# Patient Record
Sex: FEMALE | Race: WHITE | NOT HISPANIC OR LATINO
[De-identification: names, ages, dates, MRNs, and addresses within clinical notes are randomized per-mention and may not be internally consistent; named-entity substitution may affect disease eponyms.]

---

## 2017-03-31 ENCOUNTER — APPOINTMENT (OUTPATIENT)
Dept: DERMATOLOGY | Facility: CLINIC | Age: 62
End: 2017-03-31

## 2017-04-12 ENCOUNTER — APPOINTMENT (OUTPATIENT)
Dept: SURGERY | Facility: CLINIC | Age: 62
End: 2017-04-12

## 2017-04-12 VITALS
DIASTOLIC BLOOD PRESSURE: 74 MMHG | SYSTOLIC BLOOD PRESSURE: 120 MMHG | TEMPERATURE: 98.4 F | HEART RATE: 66 BPM | RESPIRATION RATE: 16 BRPM | OXYGEN SATURATION: 97 %

## 2017-04-12 DIAGNOSIS — E78.00 PURE HYPERCHOLESTEROLEMIA, UNSPECIFIED: ICD-10-CM

## 2017-04-13 ENCOUNTER — OTHER (OUTPATIENT)
Age: 62
End: 2017-04-13

## 2017-04-27 ENCOUNTER — APPOINTMENT (OUTPATIENT)
Dept: OBGYN | Facility: CLINIC | Age: 62
End: 2017-04-27

## 2017-04-27 VITALS — SYSTOLIC BLOOD PRESSURE: 110 MMHG | HEIGHT: 60 IN | DIASTOLIC BLOOD PRESSURE: 70 MMHG

## 2017-05-08 ENCOUNTER — APPOINTMENT (OUTPATIENT)
Dept: FAMILY MEDICINE | Facility: CLINIC | Age: 62
End: 2017-05-08

## 2017-05-08 VITALS
RESPIRATION RATE: 12 BRPM | TEMPERATURE: 98.7 F | SYSTOLIC BLOOD PRESSURE: 118 MMHG | HEART RATE: 67 BPM | BODY MASS INDEX: 31.41 KG/M2 | OXYGEN SATURATION: 98 % | DIASTOLIC BLOOD PRESSURE: 58 MMHG | WEIGHT: 160 LBS | HEIGHT: 60 IN

## 2017-05-25 ENCOUNTER — LABORATORY RESULT (OUTPATIENT)
Age: 62
End: 2017-05-25

## 2017-05-26 ENCOUNTER — APPOINTMENT (OUTPATIENT)
Dept: FAMILY MEDICINE | Facility: CLINIC | Age: 62
End: 2017-05-26

## 2017-05-26 ENCOUNTER — LABORATORY RESULT (OUTPATIENT)
Age: 62
End: 2017-05-26

## 2017-05-26 VITALS
SYSTOLIC BLOOD PRESSURE: 130 MMHG | HEIGHT: 60 IN | HEART RATE: 73 BPM | DIASTOLIC BLOOD PRESSURE: 74 MMHG | BODY MASS INDEX: 31.41 KG/M2 | WEIGHT: 160 LBS | TEMPERATURE: 98.7 F | OXYGEN SATURATION: 99 %

## 2017-05-26 DIAGNOSIS — R10.2 PELVIC AND PERINEAL PAIN: ICD-10-CM

## 2017-05-26 DIAGNOSIS — Z01.419 ENCOUNTER FOR GYNECOLOGICAL EXAMINATION (GENERAL) (ROUTINE) W/OUT ABNORMAL FINDINGS: ICD-10-CM

## 2017-05-26 RX ORDER — OMEGA-3/DHA/EPA/FISH OIL 300-1000MG
1000 CAPSULE ORAL
Refills: 0 | Status: ACTIVE | COMMUNITY

## 2017-05-26 RX ORDER — CARBOXYMETHYLCELLULOSE SODIUM 10 MG/ML
1 GEL OPHTHALMIC
Refills: 0 | Status: ACTIVE | COMMUNITY

## 2017-05-26 RX ORDER — LACTOBACILLUS RHAMNOSUS GG 10B CELL
CAPSULE ORAL
Refills: 0 | Status: ACTIVE | COMMUNITY

## 2017-05-26 RX ORDER — PERPHENAZINE/AMITRIPTYLINE HCL 2 MG-10 MG
TABLET ORAL
Refills: 0 | Status: ACTIVE | COMMUNITY

## 2017-05-26 RX ORDER — MULTIVIT-MIN/FA/LYCOPEN/LUTEIN .4-300-25
TABLET ORAL
Refills: 0 | Status: ACTIVE | COMMUNITY

## 2017-05-26 RX ORDER — DM/P-EPHED/ACETAMINOPH/DOXYLAM
LIQUID (ML) ORAL
Refills: 0 | Status: ACTIVE | COMMUNITY

## 2017-05-26 RX ORDER — HYDROCORTISONE ACETATE 0.5 %
CREAM (GRAM) TOPICAL
Refills: 0 | Status: ACTIVE | COMMUNITY

## 2017-05-29 LAB
ALBUMIN SERPL ELPH-MCNC: 4.8 G/DL
ALP BLD-CCNC: 56 U/L
ALT SERPL-CCNC: 18 U/L
ANION GAP SERPL CALC-SCNC: 21 MMOL/L
APPEARANCE: CLEAR
AST SERPL-CCNC: 14 U/L
BASOPHILS # BLD AUTO: 0.04 K/UL
BASOPHILS NFR BLD AUTO: 0.9 %
BILIRUB SERPL-MCNC: 0.4 MG/DL
BILIRUBIN URINE: NEGATIVE
BLOOD URINE: NEGATIVE
BUN SERPL-MCNC: 16 MG/DL
CALCIUM SERPL-MCNC: 9.8 MG/DL
CHLORIDE SERPL-SCNC: 101 MMOL/L
CHOLEST SERPL-MCNC: 287 MG/DL
CHOLEST/HDLC SERPL: 3.9 RATIO
CO2 SERPL-SCNC: 20 MMOL/L
COLOR: YELLOW
CREAT SERPL-MCNC: 0.96 MG/DL
EOSINOPHIL # BLD AUTO: 0.09 K/UL
EOSINOPHIL NFR BLD AUTO: 1.9 %
ERYTHROCYTE [SEDIMENTATION RATE] IN BLOOD BY WESTERGREN METHOD: 17 MM/HR
GLUCOSE QUALITATIVE U: NORMAL MG/DL
GLUCOSE SERPL-MCNC: 108 MG/DL
HBA1C MFR BLD HPLC: 5.5 %
HCT VFR BLD CALC: 43.3 %
HCV AB SER QL: NONREACTIVE
HCV S/CO RATIO: 0.15 S/CO
HDLC SERPL-MCNC: 74 MG/DL
HGB BLD-MCNC: 13.9 G/DL
IMM GRANULOCYTES NFR BLD AUTO: 0.2 %
KETONES URINE: NEGATIVE
LDLC SERPL CALC-MCNC: 186 MG/DL
LEUKOCYTE ESTERASE URINE: ABNORMAL
LYMPHOCYTES # BLD AUTO: 1.43 K/UL
LYMPHOCYTES NFR BLD AUTO: 30.8 %
MAN DIFF?: NORMAL
MCHC RBC-ENTMCNC: 29 PG
MCHC RBC-ENTMCNC: 32.1 GM/DL
MCV RBC AUTO: 90.2 FL
MONOCYTES # BLD AUTO: 0.41 K/UL
MONOCYTES NFR BLD AUTO: 8.8 %
NEUTROPHILS # BLD AUTO: 2.67 K/UL
NEUTROPHILS NFR BLD AUTO: 57.4 %
NITRITE URINE: NEGATIVE
PH URINE: 6
PLATELET # BLD AUTO: 279 K/UL
POTASSIUM SERPL-SCNC: 4.3 MMOL/L
PROT SERPL-MCNC: 7.2 G/DL
PROTEIN URINE: NEGATIVE MG/DL
RBC # BLD: 4.8 M/UL
RBC # FLD: 14.6 %
SODIUM SERPL-SCNC: 142 MMOL/L
SPECIFIC GRAVITY URINE: 1.01
TRIGL SERPL-MCNC: 137 MG/DL
TSH SERPL-ACNC: 2.75 UIU/ML
UROBILINOGEN URINE: NORMAL MG/DL
WBC # FLD AUTO: 4.65 K/UL

## 2017-06-06 ENCOUNTER — RX RENEWAL (OUTPATIENT)
Age: 62
End: 2017-06-06

## 2017-07-26 ENCOUNTER — RX RENEWAL (OUTPATIENT)
Age: 62
End: 2017-07-26

## 2017-08-28 ENCOUNTER — RX RENEWAL (OUTPATIENT)
Age: 62
End: 2017-08-28

## 2017-10-02 ENCOUNTER — RX RENEWAL (OUTPATIENT)
Age: 62
End: 2017-10-02

## 2017-11-07 ENCOUNTER — RX RENEWAL (OUTPATIENT)
Age: 62
End: 2017-11-07

## 2017-11-14 ENCOUNTER — APPOINTMENT (OUTPATIENT)
Dept: FAMILY MEDICINE | Facility: CLINIC | Age: 62
End: 2017-11-14
Payer: COMMERCIAL

## 2017-11-14 ENCOUNTER — LABORATORY RESULT (OUTPATIENT)
Age: 62
End: 2017-11-14

## 2017-11-14 VITALS
WEIGHT: 160 LBS | DIASTOLIC BLOOD PRESSURE: 74 MMHG | BODY MASS INDEX: 31.41 KG/M2 | HEIGHT: 60 IN | TEMPERATURE: 98.5 F | OXYGEN SATURATION: 98 % | SYSTOLIC BLOOD PRESSURE: 126 MMHG | RESPIRATION RATE: 12 BRPM | HEART RATE: 72 BPM

## 2017-11-14 PROCEDURE — 99214 OFFICE O/P EST MOD 30 MIN: CPT | Mod: 25

## 2017-11-14 PROCEDURE — 36415 COLL VENOUS BLD VENIPUNCTURE: CPT

## 2017-11-16 LAB
25(OH)D3 SERPL-MCNC: 50.9 NG/ML
ALBUMIN SERPL ELPH-MCNC: 4.8 G/DL
ALP BLD-CCNC: 57 U/L
ALT SERPL-CCNC: 33 U/L
ANION GAP SERPL CALC-SCNC: 17 MMOL/L
APPEARANCE: CLEAR
AST SERPL-CCNC: 28 U/L
BASOPHILS # BLD AUTO: 0.05 K/UL
BASOPHILS NFR BLD AUTO: 1.1 %
BILIRUB SERPL-MCNC: 0.6 MG/DL
BILIRUBIN URINE: NEGATIVE
BLOOD URINE: NEGATIVE
BUN SERPL-MCNC: 13 MG/DL
C PEPTIDE SERPL-MCNC: 1.7 NG/ML
CALCIUM SERPL-MCNC: 9.6 MG/DL
CHLORIDE SERPL-SCNC: 104 MMOL/L
CHOLEST SERPL-MCNC: 172 MG/DL
CHOLEST/HDLC SERPL: 2.3 RATIO
CO2 SERPL-SCNC: 23 MMOL/L
COLOR: YELLOW
CREAT SERPL-MCNC: 0.84 MG/DL
EOSINOPHIL # BLD AUTO: 0.07 K/UL
EOSINOPHIL NFR BLD AUTO: 1.5
GLUCOSE QUALITATIVE U: NEGATIVE MG/DL
GLUCOSE SERPL-MCNC: 104 MG/DL
HBA1C MFR BLD HPLC: 5.5 %
HCT VFR BLD CALC: 45.3 %
HDLC SERPL-MCNC: 76 MG/DL
HGB BLD-MCNC: 14.5 G/DL
IMM GRANULOCYTES NFR BLD AUTO: 0.2 %
KETONES URINE: NEGATIVE
LDLC SERPL CALC-MCNC: 73 MG/DL
LEUKOCYTE ESTERASE URINE: ABNORMAL
LYMPHOCYTES # BLD AUTO: 1.47 K/UL
LYMPHOCYTES NFR BLD AUTO: 30.9 %
MAN DIFF?: NORMAL
MCHC RBC-ENTMCNC: 29.2 PG
MCHC RBC-ENTMCNC: 32 GM/DL
MCV RBC AUTO: 91.1 FL
MONOCYTES # BLD AUTO: 0.28 K/UL
MONOCYTES NFR BLD AUTO: 5.9 %
NEUTROPHILS # BLD AUTO: 2.87 K/UL
NEUTROPHILS NFR BLD AUTO: 60.4 %
NITRITE URINE: NEGATIVE
PH URINE: 7
PLATELET # BLD AUTO: 262 K/UL
POTASSIUM SERPL-SCNC: 4.4 MMOL/L
PROT SERPL-MCNC: 7.7 G/DL
PROTEIN URINE: NEGATIVE MG/DL
RBC # BLD: 4.97 M/UL
RBC # FLD: 14.3 %
SODIUM SERPL-SCNC: 144 MMOL/L
SPECIFIC GRAVITY URINE: 1
TRIGL SERPL-MCNC: 117 MG/DL
UROBILINOGEN URINE: NEGATIVE MG/DL
WBC # FLD AUTO: 4.75 K/UL

## 2017-11-21 ENCOUNTER — APPOINTMENT (OUTPATIENT)
Dept: FAMILY MEDICINE | Facility: CLINIC | Age: 62
End: 2017-11-21
Payer: COMMERCIAL

## 2017-11-21 VITALS
TEMPERATURE: 98.6 F | RESPIRATION RATE: 12 BRPM | DIASTOLIC BLOOD PRESSURE: 68 MMHG | SYSTOLIC BLOOD PRESSURE: 110 MMHG | OXYGEN SATURATION: 98 % | HEIGHT: 60 IN | BODY MASS INDEX: 31.41 KG/M2 | WEIGHT: 160 LBS | HEART RATE: 69 BPM

## 2017-11-21 DIAGNOSIS — D17.79 BENIGN LIPOMATOUS NEOPLASM OF OTHER SITES: ICD-10-CM

## 2017-11-21 PROCEDURE — 99214 OFFICE O/P EST MOD 30 MIN: CPT

## 2017-11-28 ENCOUNTER — TRANSCRIPTION ENCOUNTER (OUTPATIENT)
Age: 62
End: 2017-11-28

## 2018-04-19 ENCOUNTER — APPOINTMENT (OUTPATIENT)
Dept: DERMATOLOGY | Facility: CLINIC | Age: 63
End: 2018-04-19
Payer: COMMERCIAL

## 2018-04-19 PROCEDURE — 99213 OFFICE O/P EST LOW 20 MIN: CPT

## 2018-05-01 ENCOUNTER — APPOINTMENT (OUTPATIENT)
Dept: OBGYN | Facility: CLINIC | Age: 63
End: 2018-05-01
Payer: COMMERCIAL

## 2018-05-01 VITALS
SYSTOLIC BLOOD PRESSURE: 124 MMHG | BODY MASS INDEX: 30.63 KG/M2 | HEIGHT: 60 IN | WEIGHT: 156 LBS | DIASTOLIC BLOOD PRESSURE: 84 MMHG

## 2018-05-01 PROCEDURE — 99396 PREV VISIT EST AGE 40-64: CPT

## 2018-05-31 ENCOUNTER — APPOINTMENT (OUTPATIENT)
Dept: FAMILY MEDICINE | Facility: CLINIC | Age: 63
End: 2018-05-31
Payer: COMMERCIAL

## 2018-05-31 ENCOUNTER — LABORATORY RESULT (OUTPATIENT)
Age: 63
End: 2018-05-31

## 2018-05-31 VITALS
DIASTOLIC BLOOD PRESSURE: 84 MMHG | HEART RATE: 72 BPM | TEMPERATURE: 98.2 F | OXYGEN SATURATION: 98 % | WEIGHT: 151 LBS | SYSTOLIC BLOOD PRESSURE: 122 MMHG | BODY MASS INDEX: 29.64 KG/M2 | RESPIRATION RATE: 14 BRPM | HEIGHT: 60 IN

## 2018-05-31 PROCEDURE — 36415 COLL VENOUS BLD VENIPUNCTURE: CPT

## 2018-05-31 PROCEDURE — 99214 OFFICE O/P EST MOD 30 MIN: CPT | Mod: 25

## 2018-05-31 NOTE — PAST MEDICAL HISTORY
[Postmenopausal] : postmenopausal [Total Preg ___] : G[unfilled] [Live Births ___] : P[unfilled]  [Full Term ___] : Full Term: [unfilled] [Premature ___] : Premature: [unfilled] [Abortions ___] : Abortions:[unfilled] [Living ___] : Living: [unfilled] [AB Induced ___] : elective abortions: [unfilled]  [AB Spont ___] : miscarriages: [unfilled]  [Ectopics ___] : ectopic pregnancies: [unfilled]  [Multiple Births ___] :  multiple birth pregnancies: [unfilled]

## 2018-05-31 NOTE — REVIEW OF SYSTEMS
[Patient Intake Form Reviewed] : Patient intake form was reviewed [Negative] : Heme/Lymph [FreeTextEntry2] : Overweight

## 2018-05-31 NOTE — HISTORY OF PRESENT ILLNESS
[FreeTextEntry1] : F/U Apt. for Lab Testing and Renewal of Meds [de-identified] : F/U Apt. for Lab Testing and Renewal of Meds\par Pt. out of Lipitor.

## 2018-05-31 NOTE — ASSESSMENT
[FreeTextEntry1] : Overweight/Fatigue - Labs for Fatigue and Dysmetabolism.\par HLD - Lipid Panel. \par Low Vit. D - Vit. D Level.\par Colon Cancer Screening - Last Colonoscopy in 2015, Due in 2018.  \par \par \par FU 2 wks to review test results.

## 2018-06-01 LAB
25(OH)D3 SERPL-MCNC: 43.2 NG/ML
ALBUMIN SERPL ELPH-MCNC: 4.8 G/DL
ALP BLD-CCNC: 58 U/L
ALT SERPL-CCNC: 30 U/L
ANION GAP SERPL CALC-SCNC: 16 MMOL/L
APPEARANCE: CLEAR
AST SERPL-CCNC: 23 U/L
BASOPHILS # BLD AUTO: 0.05 K/UL
BASOPHILS NFR BLD AUTO: 1 %
BILIRUB SERPL-MCNC: 0.5 MG/DL
BILIRUBIN URINE: NEGATIVE
BLOOD URINE: NEGATIVE
BUN SERPL-MCNC: 13 MG/DL
CALCIUM SERPL-MCNC: 9.6 MG/DL
CHLORIDE SERPL-SCNC: 103 MMOL/L
CHOLEST SERPL-MCNC: 176 MG/DL
CHOLEST/HDLC SERPL: 2.4 RATIO
CO2 SERPL-SCNC: 26 MMOL/L
COLOR: YELLOW
CREAT SERPL-MCNC: 0.9 MG/DL
EOSINOPHIL # BLD AUTO: 0.08 K/UL
EOSINOPHIL NFR BLD AUTO: 1.6 %
FERRITIN SERPL-MCNC: 110 NG/ML
FOLATE SERPL-MCNC: >20 NG/ML
GLUCOSE QUALITATIVE U: NEGATIVE MG/DL
GLUCOSE SERPL-MCNC: 110 MG/DL
HBA1C MFR BLD HPLC: 5.5 %
HCT VFR BLD CALC: 45.1 %
HDLC SERPL-MCNC: 73 MG/DL
HGB BLD-MCNC: 13.7 G/DL
IMM GRANULOCYTES NFR BLD AUTO: 0 %
IRON SATN MFR SERPL: 28 %
IRON SERPL-MCNC: 88 UG/DL
KETONES URINE: NEGATIVE
LDLC SERPL CALC-MCNC: 81 MG/DL
LEUKOCYTE ESTERASE URINE: ABNORMAL
LYMPHOCYTES # BLD AUTO: 1.54 K/UL
LYMPHOCYTES NFR BLD AUTO: 30.3 %
MAN DIFF?: NORMAL
MCHC RBC-ENTMCNC: 28.4 PG
MCHC RBC-ENTMCNC: 30.4 GM/DL
MCV RBC AUTO: 93.4 FL
MONOCYTES # BLD AUTO: 0.4 K/UL
MONOCYTES NFR BLD AUTO: 7.9 %
NEUTROPHILS # BLD AUTO: 3.01 K/UL
NEUTROPHILS NFR BLD AUTO: 59.2 %
NITRITE URINE: NEGATIVE
PH URINE: 7
PLATELET # BLD AUTO: 290 K/UL
POTASSIUM SERPL-SCNC: 4.7 MMOL/L
PROT SERPL-MCNC: 7.2 G/DL
PROTEIN URINE: NEGATIVE MG/DL
RBC # BLD: 4.83 M/UL
RBC # FLD: 14.7 %
SODIUM SERPL-SCNC: 145 MMOL/L
SPECIFIC GRAVITY URINE: 1.01
T4 SERPL-MCNC: 9.4 UG/DL
TIBC SERPL-MCNC: 319 UG/DL
TRIGL SERPL-MCNC: 111 MG/DL
TSH SERPL-ACNC: 2.13 UIU/ML
UIBC SERPL-MCNC: 231 UG/DL
UROBILINOGEN URINE: NEGATIVE MG/DL
VIT B12 SERPL-MCNC: 1072 PG/ML
WBC # FLD AUTO: 5.08 K/UL

## 2018-06-05 LAB — C PEPTIDE SERPL-MCNC: 2.7 NG/ML

## 2018-06-07 ENCOUNTER — APPOINTMENT (OUTPATIENT)
Dept: FAMILY MEDICINE | Facility: CLINIC | Age: 63
End: 2018-06-07
Payer: COMMERCIAL

## 2018-06-07 VITALS
WEIGHT: 151 LBS | DIASTOLIC BLOOD PRESSURE: 72 MMHG | SYSTOLIC BLOOD PRESSURE: 118 MMHG | HEIGHT: 60 IN | HEART RATE: 69 BPM | TEMPERATURE: 98.1 F | OXYGEN SATURATION: 98 % | BODY MASS INDEX: 29.64 KG/M2 | RESPIRATION RATE: 12 BRPM

## 2018-06-07 PROCEDURE — 99214 OFFICE O/P EST MOD 30 MIN: CPT

## 2018-06-07 NOTE — HEALTH RISK ASSESSMENT
[No falls in past year] : Patient reported no falls in the past year [0] : 2) Feeling down, depressed, or hopeless: Not at all (0) [Patient reported colonoscopy was normal] : Patient reported colonoscopy was normal [HIV Test offered] : HIV Test offered [Hepatitis C test offered] : Hepatitis C test offered [None] : None [With Significant Other] : lives with significant other [# of Members in Household ___] :  household currently consist of [unfilled] member(s) [Retired] : retired [High School] : high school [] :  [Sexually Active] : sexually active [Feels Safe at Home] : Feels safe at home [Fully functional (bathing, dressing, toileting, transferring, walking, feeding)] : Fully functional (bathing, dressing, toileting, transferring, walking, feeding) [Fully functional (using the telephone, shopping, preparing meals, housekeeping, doing laundry, using] : Fully functional and needs no help or supervision to perform IADLs (using the telephone, shopping, preparing meals, housekeeping, doing laundry, using transportation, managing medications and managing finances) [Smoke Detector] : smoke detector [Carbon Monoxide Detector] : carbon monoxide detector [Safety elements used in home] : safety elements used in home [Seat Belt] :  uses seat belt [Sunscreen] : uses sunscreen [Patient reported PAP Smear was normal] : Patient reported PAP Smear was normal [Change in mental status noted] : No change in mental status noted [Language] : denies difficulty with language [Behavior] : denies difficulty with behavior [Learning/Retaining New Information] : denies difficulty learning/retaining new information [Handling Complex Tasks] : denies difficulty handling complex tasks [Reasoning] : denies difficulty with reasoning [Spatial Ability and Orientation] : denies difficulty with spatial ability and orientation [High Risk Behavior] : no high risk behavior [Reports changes in hearing] : Reports no changes in hearing [Reports changes in vision] : Reports no changes in vision [Reports changes in dental health] : Reports no changes in dental health [Guns at Home] : no guns at home [Travel to Developing Areas] : does not  travel to developing areas [TB Exposure] : is not being exposed to tuberculosis [Caregiver Concerns] : does not have caregiver concerns [PapSmearDate] : 06/17 [ColonoscopyDate] : 05/15 [ColonoscopyComments] : Due in 2018 [HIVComments] : Next Lab Testing [HepatitisCComments] : Next Lab Testing [] : No

## 2018-06-07 NOTE — HISTORY OF PRESENT ILLNESS
[FreeTextEntry1] : F/U Apt. to Review Lab Testing [de-identified] : F/U Apt. to Review Lab Testing

## 2018-06-07 NOTE — ASSESSMENT
[FreeTextEntry1] : Overweight/Fatigue - Diet and Exercise\par HLD - Controlled with Meds.\par Low Vit. D - Vit. D.\par Colon Cancer Screening - Last Colonoscopy in 2015, Due in 2018.  \par \par \par FU 2 wks to review test results.

## 2018-06-19 ENCOUNTER — APPOINTMENT (OUTPATIENT)
Dept: DERMATOLOGY | Facility: CLINIC | Age: 63
End: 2018-06-19

## 2018-07-31 ENCOUNTER — RX RENEWAL (OUTPATIENT)
Age: 63
End: 2018-07-31

## 2018-08-06 ENCOUNTER — RX RENEWAL (OUTPATIENT)
Age: 63
End: 2018-08-06

## 2019-03-19 ENCOUNTER — LABORATORY RESULT (OUTPATIENT)
Age: 64
End: 2019-03-19

## 2019-03-19 ENCOUNTER — APPOINTMENT (OUTPATIENT)
Dept: FAMILY MEDICINE | Facility: CLINIC | Age: 64
End: 2019-03-19
Payer: MEDICARE

## 2019-03-19 VITALS
TEMPERATURE: 97.7 F | HEIGHT: 60 IN | OXYGEN SATURATION: 96 % | RESPIRATION RATE: 12 BRPM | DIASTOLIC BLOOD PRESSURE: 78 MMHG | HEART RATE: 76 BPM | SYSTOLIC BLOOD PRESSURE: 124 MMHG | WEIGHT: 156 LBS | BODY MASS INDEX: 30.63 KG/M2

## 2019-03-19 PROCEDURE — 36415 COLL VENOUS BLD VENIPUNCTURE: CPT

## 2019-03-19 PROCEDURE — 99214 OFFICE O/P EST MOD 30 MIN: CPT | Mod: 25

## 2019-03-21 LAB
25(OH)D3 SERPL-MCNC: 64.6 NG/ML
ALBUMIN SERPL ELPH-MCNC: 4.7 G/DL
ALP BLD-CCNC: 55 U/L
ALT SERPL-CCNC: 23 U/L
ANION GAP SERPL CALC-SCNC: 15 MMOL/L
APPEARANCE: CLEAR
AST SERPL-CCNC: 21 U/L
BASOPHILS # BLD AUTO: 0.08 K/UL
BASOPHILS NFR BLD AUTO: 1.2 %
BILIRUB SERPL-MCNC: 0.3 MG/DL
BILIRUBIN URINE: NEGATIVE
BLOOD URINE: NEGATIVE
BUN SERPL-MCNC: 15 MG/DL
C PEPTIDE SERPL-MCNC: 4 NG/ML
CALCIUM SERPL-MCNC: 10 MG/DL
CHLORIDE SERPL-SCNC: 105 MMOL/L
CHOLEST SERPL-MCNC: 162 MG/DL
CHOLEST/HDLC SERPL: 2.3 RATIO
CO2 SERPL-SCNC: 22 MMOL/L
COLOR: NORMAL
CREAT SERPL-MCNC: 0.96 MG/DL
CREAT SPEC-SCNC: 62 MG/DL
EOSINOPHIL # BLD AUTO: 0.11 K/UL
EOSINOPHIL NFR BLD AUTO: 1.7 %
FERRITIN SERPL-MCNC: 102 NG/ML
FOLATE SERPL-MCNC: >20 NG/ML
GLUCOSE QUALITATIVE U: NEGATIVE
GLUCOSE SERPL-MCNC: 98 MG/DL
HBA1C MFR BLD HPLC: 5.5 %
HCT VFR BLD CALC: 45.7 %
HDLC SERPL-MCNC: 72 MG/DL
HGB BLD-MCNC: 13.9 G/DL
HIV1+2 AB SPEC QL IA.RAPID: NONREACTIVE
IMM GRANULOCYTES NFR BLD AUTO: 0.2 %
IRON SATN MFR SERPL: 21 %
IRON SERPL-MCNC: 62 UG/DL
KETONES URINE: NEGATIVE
LDLC SERPL CALC-MCNC: 59 MG/DL
LEUKOCYTE ESTERASE URINE: ABNORMAL
LYMPHOCYTES # BLD AUTO: 1.96 K/UL
LYMPHOCYTES NFR BLD AUTO: 29.7 %
MAN DIFF?: NORMAL
MCHC RBC-ENTMCNC: 28.9 PG
MCHC RBC-ENTMCNC: 30.4 GM/DL
MCV RBC AUTO: 95 FL
MICROALBUMIN 24H UR DL<=1MG/L-MCNC: <1.2 MG/DL
MICROALBUMIN/CREAT 24H UR-RTO: NORMAL MG/G
MONOCYTES # BLD AUTO: 0.55 K/UL
MONOCYTES NFR BLD AUTO: 8.3 %
NEUTROPHILS # BLD AUTO: 3.89 K/UL
NEUTROPHILS NFR BLD AUTO: 58.9 %
NITRITE URINE: NEGATIVE
PH URINE: 6
PLATELET # BLD AUTO: 275 K/UL
POTASSIUM SERPL-SCNC: 4.6 MMOL/L
PROT SERPL-MCNC: 7.2 G/DL
PROTEIN URINE: NEGATIVE
RBC # BLD: 4.81 M/UL
RBC # FLD: 14.5 %
SODIUM SERPL-SCNC: 141 MMOL/L
SPECIFIC GRAVITY URINE: 1.01
T4 SERPL-MCNC: 8.8 UG/DL
TIBC SERPL-MCNC: 296 UG/DL
TRIGL SERPL-MCNC: 157 MG/DL
TSH SERPL-ACNC: 4.04 UIU/ML
UIBC SERPL-MCNC: 234 UG/DL
UROBILINOGEN URINE: NORMAL
VIT B12 SERPL-MCNC: 1052 PG/ML
WBC # FLD AUTO: 6.6 K/UL

## 2019-04-02 ENCOUNTER — APPOINTMENT (OUTPATIENT)
Dept: FAMILY MEDICINE | Facility: CLINIC | Age: 64
End: 2019-04-02
Payer: MEDICARE

## 2019-04-02 VITALS
DIASTOLIC BLOOD PRESSURE: 70 MMHG | TEMPERATURE: 98 F | HEIGHT: 60 IN | HEART RATE: 66 BPM | WEIGHT: 156 LBS | SYSTOLIC BLOOD PRESSURE: 124 MMHG | BODY MASS INDEX: 30.63 KG/M2 | RESPIRATION RATE: 12 BRPM | OXYGEN SATURATION: 98 %

## 2019-04-02 PROCEDURE — 99214 OFFICE O/P EST MOD 30 MIN: CPT

## 2019-04-12 ENCOUNTER — RX CHANGE (OUTPATIENT)
Age: 64
End: 2019-04-12

## 2019-04-15 ENCOUNTER — RX CHANGE (OUTPATIENT)
Age: 64
End: 2019-04-15

## 2019-05-15 ENCOUNTER — APPOINTMENT (OUTPATIENT)
Dept: FAMILY MEDICINE | Facility: CLINIC | Age: 64
End: 2019-05-15
Payer: MEDICARE

## 2019-05-15 ENCOUNTER — NON-APPOINTMENT (OUTPATIENT)
Age: 64
End: 2019-05-15

## 2019-05-15 VITALS
SYSTOLIC BLOOD PRESSURE: 124 MMHG | RESPIRATION RATE: 13 BRPM | OXYGEN SATURATION: 98 % | WEIGHT: 156 LBS | HEART RATE: 74 BPM | DIASTOLIC BLOOD PRESSURE: 72 MMHG | BODY MASS INDEX: 30.63 KG/M2 | HEIGHT: 60 IN

## 2019-05-15 PROCEDURE — 99215 OFFICE O/P EST HI 40 MIN: CPT | Mod: 25

## 2019-05-15 PROCEDURE — 93000 ELECTROCARDIOGRAM COMPLETE: CPT | Mod: 59

## 2019-05-15 NOTE — ASSESSMENT
[Patient Optimized for Surgery] : Patient optimized for surgery [As per surgery] : as per surgery [Continue medications as is] : Continue current medications [FreeTextEntry4] : Patient is a 64-year-old female is scheduled for bilateral cataract surgery.\par Preop EKG done.----No acute changes.\par Patient has no significant contraindications.\par She is medically optimized for scheduled surgery.

## 2019-05-15 NOTE — HISTORY OF PRESENT ILLNESS
[No Pertinent Cardiac History] : no history of aortic stenosis, atrial fibrillation, coronary artery disease, recent myocardial infarction, or implantable device/pacemaker [No Pertinent Pulmonary History] : no history of asthma, COPD, sleep apnea, or smoking [No Adverse Anesthesia Reaction] : no adverse anesthesia reaction in self or family member [Moderate (4-6 METs)] : Moderate (4-6 METs) [(Patient denies any chest pain, claudication, dyspnea on exertion, orthopnea, palpitations or syncope)] : Patient denies any chest pain, claudication, dyspnea on exertion, orthopnea, palpitations or syncope [Aortic Stenosis] : no aortic stenosis [Recent Myocardial Infarction] : no recent myocardial infarction [Coronary Artery Disease] : no coronary artery disease [Atrial Fibrillation] : no atrial fibrillation [Asthma] : no asthma [Implantable Device/Pacemaker] : no implantable device/pacemaker [COPD] : no COPD [Sleep Apnea] : no sleep apnea [Smoker] : not a smoker [Chronic Anticoagulation] : no chronic anticoagulation [Self] : no previous adverse anesthesia reaction [Family Member] : no family member with adverse anesthesia reaction/sudden death [Diabetes] : no diabetes [Chronic Kidney Disease] : no chronic kidney disease [FreeTextEntry2] : 5/31/19 and 6/14/19 [FreeTextEntry1] : Bilateral Cataracts [FreeTextEntry4] : Patient is a 64-year-old female here for preop clearance for bilateral cataract surgery scheduled for May 31 and June 14. Surgeon is .\par Past medical history significant for hyperlipidemia for which patient takes Crestor.\par Past surgical history significant for total abdominal hysterectomy with bilateral salpingo-oophorectomy.\par Patient is a nonsmoker, no alcohol and no drugs.\par No significant cardiac history.\par No acute complaints today. [FreeTextEntry3] :  [FreeTextEntry7] : Stress test 25 yrs ---WNL

## 2019-05-15 NOTE — PHYSICAL EXAM
[No Acute Distress] : no acute distress [Well-Appearing] : well-appearing [Well Nourished] : well nourished [Well Developed] : well developed [Normal Sclera/Conjunctiva] : normal sclera/conjunctiva [PERRL] : pupils equal round and reactive to light [EOMI] : extraocular movements intact [Normal Oropharynx] : the oropharynx was normal [Normal Outer Ear/Nose] : the outer ears and nose were normal in appearance [Normal TMs] : both tympanic membranes were normal [Supple] : supple [No Lymphadenopathy] : no lymphadenopathy [No JVD] : no jugular venous distention [Thyroid Normal, No Nodules] : the thyroid was normal and there were no nodules present [No Respiratory Distress] : no respiratory distress  [No Accessory Muscle Use] : no accessory muscle use [Clear to Auscultation] : lungs were clear to auscultation bilaterally [Normal Rate] : normal rate  [Regular Rhythm] : with a regular rhythm [No Murmur] : no murmur heard [Normal S1, S2] : normal S1 and S2 [No Abdominal Bruit] : a ~M bruit was not heard ~T in the abdomen [No Carotid Bruits] : no carotid bruits [Pedal Pulses Present] : the pedal pulses are present [No Varicosities] : no varicosities [No Edema] : there was no peripheral edema [No Palpable Aorta] : no palpable aorta [No Extremity Clubbing/Cyanosis] : no extremity clubbing/cyanosis [Non Tender] : non-tender [Soft] : abdomen soft [Non-distended] : non-distended [Normal Bowel Sounds] : normal bowel sounds [No Masses] : no abdominal mass palpated [No HSM] : no HSM [Normal Posterior Cervical Nodes] : no posterior cervical lymphadenopathy [Normal Anterior Cervical Nodes] : no anterior cervical lymphadenopathy [No Joint Swelling] : no joint swelling [No CVA Tenderness] : no CVA  tenderness [No Spinal Tenderness] : no spinal tenderness [Grossly Normal Strength/Tone] : grossly normal strength/tone [No Rash] : no rash [Coordination Grossly Intact] : coordination grossly intact [Normal Gait] : normal gait [No Focal Deficits] : no focal deficits [Deep Tendon Reflexes (DTR)] : deep tendon reflexes were 2+ and symmetric [Normal Insight/Judgement] : insight and judgment were intact [Normal Affect] : the affect was normal

## 2019-06-19 ENCOUNTER — RX RENEWAL (OUTPATIENT)
Age: 64
End: 2019-06-19

## 2019-06-20 ENCOUNTER — RX RENEWAL (OUTPATIENT)
Age: 64
End: 2019-06-20

## 2019-06-25 ENCOUNTER — APPOINTMENT (OUTPATIENT)
Dept: OBGYN | Facility: CLINIC | Age: 64
End: 2019-06-25
Payer: MEDICARE

## 2019-06-25 VITALS
WEIGHT: 154 LBS | HEIGHT: 60 IN | HEART RATE: 71 BPM | BODY MASS INDEX: 30.23 KG/M2 | DIASTOLIC BLOOD PRESSURE: 74 MMHG | SYSTOLIC BLOOD PRESSURE: 124 MMHG

## 2019-06-25 DIAGNOSIS — N95.2 POSTMENOPAUSAL ATROPHIC VAGINITIS: ICD-10-CM

## 2019-06-25 PROCEDURE — 99397 PER PM REEVAL EST PAT 65+ YR: CPT

## 2019-06-25 NOTE — CHIEF COMPLAINT
[FreeTextEntry1] : 65yo P2 LMP 20 years ago RAQUEL/BSO presents for annual Gyn exam.  She has no complaints. Patient states recently has cataract surgery.  Patient states did not schedule appt for DEXA\par Patient states daughter has 3 yo son, Grandson River,  Patient states son does not want get .

## 2019-06-25 NOTE — PHYSICAL EXAM
[Awake] : awake [Alert] : alert [Soft] : soft [Oriented x3] : oriented to person, place, and time [Normal] : vagina [Atrophy] : atrophy [No Bleeding] : there was no active vaginal bleeding [Absent] : absent [Adnexa Absent] : absent bilaterally [Acute Distress] : no acute distress [Mass] : no breast mass [Nipple Discharge] : no nipple discharge [Axillary LAD] : no axillary lymphadenopathy [Tender] : non tender

## 2019-06-25 NOTE — HISTORY OF PRESENT ILLNESS
[Last Mammogram ___] : Last Mammogram was [unfilled] [Last Colonoscopy ___] : Last colonoscopy [unfilled] [Sexually Active] : is sexually active [Monogamous] : is monogamous [Male ___] : [unfilled] male [de-identified] :  x 44 years

## 2019-07-14 ENCOUNTER — RX RENEWAL (OUTPATIENT)
Age: 64
End: 2019-07-14

## 2019-08-16 ENCOUNTER — RESULT REVIEW (OUTPATIENT)
Age: 64
End: 2019-08-16

## 2019-09-24 ENCOUNTER — LABORATORY RESULT (OUTPATIENT)
Age: 64
End: 2019-09-24

## 2019-09-24 ENCOUNTER — APPOINTMENT (OUTPATIENT)
Dept: FAMILY MEDICINE | Facility: CLINIC | Age: 64
End: 2019-09-24
Payer: MEDICARE

## 2019-09-24 VITALS
HEART RATE: 75 BPM | RESPIRATION RATE: 13 BRPM | BODY MASS INDEX: 30.43 KG/M2 | WEIGHT: 155 LBS | OXYGEN SATURATION: 98 % | SYSTOLIC BLOOD PRESSURE: 118 MMHG | DIASTOLIC BLOOD PRESSURE: 80 MMHG | HEIGHT: 60 IN

## 2019-09-24 DIAGNOSIS — Z01.818 ENCOUNTER FOR OTHER PREPROCEDURAL EXAMINATION: ICD-10-CM

## 2019-09-24 DIAGNOSIS — Z00.00 ENCOUNTER FOR GENERAL ADULT MEDICAL EXAMINATION W/OUT ABNORMAL FINDINGS: ICD-10-CM

## 2019-09-24 DIAGNOSIS — H91.90 UNSPECIFIED HEARING LOSS, UNSPECIFIED EAR: ICD-10-CM

## 2019-09-24 DIAGNOSIS — Z01.419 ENCOUNTER FOR GYNECOLOGICAL EXAMINATION (GENERAL) (ROUTINE) W/OUT ABNORMAL FINDINGS: ICD-10-CM

## 2019-09-24 PROCEDURE — 36415 COLL VENOUS BLD VENIPUNCTURE: CPT

## 2019-09-24 PROCEDURE — 99214 OFFICE O/P EST MOD 30 MIN: CPT | Mod: 25

## 2019-09-26 ENCOUNTER — APPOINTMENT (OUTPATIENT)
Dept: FAMILY MEDICINE | Facility: CLINIC | Age: 64
End: 2019-09-26
Payer: MEDICARE

## 2019-09-26 VITALS
OXYGEN SATURATION: 98 % | DIASTOLIC BLOOD PRESSURE: 76 MMHG | HEART RATE: 76 BPM | WEIGHT: 155 LBS | BODY MASS INDEX: 30.43 KG/M2 | HEIGHT: 60 IN | SYSTOLIC BLOOD PRESSURE: 124 MMHG | TEMPERATURE: 97.6 F | RESPIRATION RATE: 12 BRPM

## 2019-09-26 LAB
25(OH)D3 SERPL-MCNC: 75.7 NG/ML
ALBUMIN SERPL ELPH-MCNC: 4.7 G/DL
ALP BLD-CCNC: 51 U/L
ALT SERPL-CCNC: 22 U/L
ANION GAP SERPL CALC-SCNC: 14 MMOL/L
APPEARANCE: CLEAR
AST SERPL-CCNC: 22 U/L
BASOPHILS # BLD AUTO: 0.08 K/UL
BASOPHILS NFR BLD AUTO: 1.8 %
BILIRUB SERPL-MCNC: 0.5 MG/DL
BILIRUBIN URINE: NEGATIVE
BLOOD URINE: NEGATIVE
BUN SERPL-MCNC: 12 MG/DL
C PEPTIDE SERPL-MCNC: 2.8 NG/ML
CALCIUM SERPL-MCNC: 9.7 MG/DL
CHLORIDE SERPL-SCNC: 104 MMOL/L
CHOLEST SERPL-MCNC: 174 MG/DL
CHOLEST/HDLC SERPL: 2.1 RATIO
CO2 SERPL-SCNC: 22 MMOL/L
COLOR: COLORLESS
CREAT SERPL-MCNC: 0.8 MG/DL
CREAT SPEC-SCNC: 19 MG/DL
EOSINOPHIL # BLD AUTO: 0.06 K/UL
EOSINOPHIL NFR BLD AUTO: 1.4 %
ESTIMATED AVERAGE GLUCOSE: 108 MG/DL
FERRITIN SERPL-MCNC: 95 NG/ML
FOLATE SERPL-MCNC: >20 NG/ML
GLUCOSE QUALITATIVE U: NEGATIVE
GLUCOSE SERPL-MCNC: 101 MG/DL
HBA1C MFR BLD HPLC: 5.4 %
HCT VFR BLD CALC: 46.7 %
HDLC SERPL-MCNC: 82 MG/DL
HGB BLD-MCNC: 14.2 G/DL
IMM GRANULOCYTES NFR BLD AUTO: 0 %
IRON SATN MFR SERPL: 34 %
IRON SERPL-MCNC: 114 UG/DL
KETONES URINE: NEGATIVE
LDLC SERPL CALC-MCNC: 72 MG/DL
LEUKOCYTE ESTERASE URINE: ABNORMAL
LYMPHOCYTES # BLD AUTO: 1.31 K/UL
LYMPHOCYTES NFR BLD AUTO: 29.8 %
MAN DIFF?: NORMAL
MCHC RBC-ENTMCNC: 29.2 PG
MCHC RBC-ENTMCNC: 30.4 GM/DL
MCV RBC AUTO: 96.1 FL
MICROALBUMIN 24H UR DL<=1MG/L-MCNC: <1.2 MG/DL
MICROALBUMIN/CREAT 24H UR-RTO: NORMAL MG/G
MONOCYTES # BLD AUTO: 0.39 K/UL
MONOCYTES NFR BLD AUTO: 8.9 %
NEUTROPHILS # BLD AUTO: 2.55 K/UL
NEUTROPHILS NFR BLD AUTO: 58.1 %
NITRITE URINE: NEGATIVE
PH URINE: 6
PLATELET # BLD AUTO: 272 K/UL
POTASSIUM SERPL-SCNC: 4.3 MMOL/L
PROT SERPL-MCNC: 7.2 G/DL
PROTEIN URINE: NEGATIVE
RBC # BLD: 4.86 M/UL
RBC # FLD: 14.6 %
SODIUM SERPL-SCNC: 140 MMOL/L
SPECIFIC GRAVITY URINE: 1
T4 SERPL-MCNC: 8.3 UG/DL
TIBC SERPL-MCNC: 331 UG/DL
TRIGL SERPL-MCNC: 99 MG/DL
TSH SERPL-ACNC: 2.81 UIU/ML
UIBC SERPL-MCNC: 217 UG/DL
UROBILINOGEN URINE: NORMAL
VIT B12 SERPL-MCNC: 990 PG/ML
WBC # FLD AUTO: 4.39 K/UL

## 2019-09-26 PROCEDURE — G0008: CPT

## 2019-09-26 PROCEDURE — 90662 IIV NO PRSV INCREASED AG IM: CPT

## 2019-09-26 PROCEDURE — 90670 PCV13 VACCINE IM: CPT

## 2019-09-26 PROCEDURE — G0009: CPT

## 2019-09-26 NOTE — HISTORY OF PRESENT ILLNESS
[FreeTextEntry1] : 64-year-old female here for flu shot and Prevnar 13.\par Patient has no complaints today.

## 2019-09-26 NOTE — ASSESSMENT
[FreeTextEntry1] : High-dose flu shot given in left deltoid.\par Prevnar 13 given in right deltoid.\par Patient tolerated the injections well.

## 2019-10-22 ENCOUNTER — RX RENEWAL (OUTPATIENT)
Age: 64
End: 2019-10-22

## 2019-10-22 ENCOUNTER — RX CHANGE (OUTPATIENT)
Age: 64
End: 2019-10-22

## 2019-12-16 ENCOUNTER — TRANSCRIPTION ENCOUNTER (OUTPATIENT)
Age: 64
End: 2019-12-16

## 2020-02-10 ENCOUNTER — RX RENEWAL (OUTPATIENT)
Age: 65
End: 2020-02-10

## 2020-03-10 ENCOUNTER — APPOINTMENT (OUTPATIENT)
Dept: FAMILY MEDICINE | Facility: CLINIC | Age: 65
End: 2020-03-10

## 2020-06-23 ENCOUNTER — APPOINTMENT (OUTPATIENT)
Dept: OBGYN | Facility: CLINIC | Age: 65
End: 2020-06-23

## 2020-06-23 ENCOUNTER — APPOINTMENT (OUTPATIENT)
Dept: OBGYN | Facility: CLINIC | Age: 65
End: 2020-06-23
Payer: MEDICARE

## 2020-06-23 VITALS
SYSTOLIC BLOOD PRESSURE: 146 MMHG | BODY MASS INDEX: 28.47 KG/M2 | HEIGHT: 60 IN | WEIGHT: 145 LBS | DIASTOLIC BLOOD PRESSURE: 82 MMHG

## 2020-06-23 PROCEDURE — 99213 OFFICE O/P EST LOW 20 MIN: CPT

## 2020-06-27 NOTE — PHYSICAL EXAM
[Normal] : vagina [Cystocele] : a cystocele [No Bleeding] : there was no active vaginal bleeding [Absent] : absent [Atrophy] : no atrophy [Adnexa Absent] : absent bilaterally

## 2020-06-27 NOTE — CHIEF COMPLAINT
[FreeTextEntry1] : 63 yo P2 RAQUEL/BSO 25 years ago Patient reports on the 6/19/2020 has noticed " something falling out of the vagina".  She denies vaginal pain.  She denies urinary symptoms, no GI symptoms\par Patient reports very nervous. 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 She has a 2 week old Grandson.

## 2020-08-11 ENCOUNTER — LABORATORY RESULT (OUTPATIENT)
Age: 65
End: 2020-08-11

## 2020-08-11 ENCOUNTER — APPOINTMENT (OUTPATIENT)
Dept: FAMILY MEDICINE | Facility: CLINIC | Age: 65
End: 2020-08-11
Payer: MEDICARE

## 2020-08-11 DIAGNOSIS — H66.93 OTITIS MEDIA, UNSPECIFIED, BILATERAL: ICD-10-CM

## 2020-08-11 PROCEDURE — 36415 COLL VENOUS BLD VENIPUNCTURE: CPT

## 2020-08-11 PROCEDURE — 99214 OFFICE O/P EST MOD 30 MIN: CPT | Mod: 25

## 2020-08-13 LAB
25(OH)D3 SERPL-MCNC: 81 NG/ML
ALBUMIN SERPL ELPH-MCNC: 5.1 G/DL
ALP BLD-CCNC: 50 U/L
ALT SERPL-CCNC: 14 U/L
ANION GAP SERPL CALC-SCNC: 14 MMOL/L
APPEARANCE: CLEAR
AST SERPL-CCNC: 18 U/L
BASOPHILS # BLD AUTO: 0.04 K/UL
BASOPHILS NFR BLD AUTO: 0.8 %
BILIRUB SERPL-MCNC: 0.7 MG/DL
BILIRUBIN URINE: NEGATIVE
BLOOD URINE: NEGATIVE
BUN SERPL-MCNC: 14 MG/DL
C PEPTIDE SERPL-MCNC: 2.3 NG/ML
CALCIUM SERPL-MCNC: 9.7 MG/DL
CHLORIDE SERPL-SCNC: 104 MMOL/L
CHOLEST SERPL-MCNC: 179 MG/DL
CHOLEST/HDLC SERPL: 2.2 RATIO
CO2 SERPL-SCNC: 24 MMOL/L
COLOR: NORMAL
CREAT SERPL-MCNC: 0.88 MG/DL
CREAT SPEC-SCNC: 121 MG/DL
EOSINOPHIL # BLD AUTO: 0.05 K/UL
EOSINOPHIL NFR BLD AUTO: 1 %
ESTIMATED AVERAGE GLUCOSE: 105 MG/DL
FERRITIN SERPL-MCNC: 140 NG/ML
FOLATE SERPL-MCNC: >20 NG/ML
GLUCOSE QUALITATIVE U: NEGATIVE
GLUCOSE SERPL-MCNC: 90 MG/DL
HBA1C MFR BLD HPLC: 5.3 %
HCT VFR BLD CALC: 47.5 %
HDLC SERPL-MCNC: 80 MG/DL
HGB BLD-MCNC: 14.4 G/DL
IMM GRANULOCYTES NFR BLD AUTO: 0.2 %
IRON SATN MFR SERPL: 27 %
IRON SERPL-MCNC: 91 UG/DL
KETONES URINE: NEGATIVE
LDLC SERPL CALC-MCNC: 79 MG/DL
LEUKOCYTE ESTERASE URINE: NEGATIVE
LYMPHOCYTES # BLD AUTO: 1.41 K/UL
LYMPHOCYTES NFR BLD AUTO: 29.2 %
MAN DIFF?: NORMAL
MCHC RBC-ENTMCNC: 29.3 PG
MCHC RBC-ENTMCNC: 30.3 GM/DL
MCV RBC AUTO: 96.5 FL
MICROALBUMIN 24H UR DL<=1MG/L-MCNC: <1.2 MG/DL
MICROALBUMIN/CREAT 24H UR-RTO: NORMAL MG/G
MONOCYTES # BLD AUTO: 0.32 K/UL
MONOCYTES NFR BLD AUTO: 6.6 %
NEUTROPHILS # BLD AUTO: 3 K/UL
NEUTROPHILS NFR BLD AUTO: 62.2 %
NITRITE URINE: NEGATIVE
PH URINE: 5.5
PLATELET # BLD AUTO: 261 K/UL
POTASSIUM SERPL-SCNC: 4.8 MMOL/L
PROT SERPL-MCNC: 7.1 G/DL
PROTEIN URINE: NEGATIVE
RBC # BLD: 4.92 M/UL
RBC # FLD: 14.8 %
SODIUM SERPL-SCNC: 142 MMOL/L
SPECIFIC GRAVITY URINE: 1.02
T4 SERPL-MCNC: 9.2 UG/DL
TIBC SERPL-MCNC: 341 UG/DL
TRIGL SERPL-MCNC: 99 MG/DL
TSH SERPL-ACNC: 2.48 UIU/ML
UIBC SERPL-MCNC: 250 UG/DL
UROBILINOGEN URINE: NORMAL
VIT B12 SERPL-MCNC: 974 PG/ML
WBC # FLD AUTO: 4.83 K/UL

## 2020-08-21 LAB
SARS-COV-2 IGG SERPL IA-ACNC: 0.08 INDEX
SARS-COV-2 IGG SERPL QL IA: NEGATIVE

## 2020-09-22 ENCOUNTER — RX RENEWAL (OUTPATIENT)
Age: 65
End: 2020-09-22

## 2020-09-29 ENCOUNTER — APPOINTMENT (OUTPATIENT)
Dept: FAMILY MEDICINE | Facility: CLINIC | Age: 65
End: 2020-09-29
Payer: MEDICARE

## 2020-09-29 VITALS
RESPIRATION RATE: 14 BRPM | TEMPERATURE: 97.6 F | HEIGHT: 60 IN | DIASTOLIC BLOOD PRESSURE: 80 MMHG | HEART RATE: 80 BPM | BODY MASS INDEX: 28.47 KG/M2 | OXYGEN SATURATION: 98 % | WEIGHT: 145 LBS | SYSTOLIC BLOOD PRESSURE: 146 MMHG

## 2020-09-29 PROCEDURE — 99213 OFFICE O/P EST LOW 20 MIN: CPT | Mod: 25

## 2020-09-29 PROCEDURE — G0009: CPT

## 2020-09-29 PROCEDURE — 90732 PPSV23 VACC 2 YRS+ SUBQ/IM: CPT

## 2020-09-29 PROCEDURE — 90662 IIV NO PRSV INCREASED AG IM: CPT

## 2020-09-29 PROCEDURE — G0008: CPT

## 2021-04-29 ENCOUNTER — APPOINTMENT (OUTPATIENT)
Dept: FAMILY MEDICINE | Facility: CLINIC | Age: 66
End: 2021-04-29
Payer: MEDICARE

## 2021-04-29 ENCOUNTER — LABORATORY RESULT (OUTPATIENT)
Age: 66
End: 2021-04-29

## 2021-04-29 VITALS
HEART RATE: 76 BPM | DIASTOLIC BLOOD PRESSURE: 80 MMHG | WEIGHT: 147 LBS | RESPIRATION RATE: 16 BRPM | SYSTOLIC BLOOD PRESSURE: 122 MMHG | BODY MASS INDEX: 28.86 KG/M2 | TEMPERATURE: 98 F | OXYGEN SATURATION: 98 % | HEIGHT: 60 IN

## 2021-04-29 PROCEDURE — 99214 OFFICE O/P EST MOD 30 MIN: CPT | Mod: 25

## 2021-04-29 PROCEDURE — 99072 ADDL SUPL MATRL&STAF TM PHE: CPT

## 2021-04-29 PROCEDURE — 36415 COLL VENOUS BLD VENIPUNCTURE: CPT

## 2021-04-30 LAB
25(OH)D3 SERPL-MCNC: 74.8 NG/ML
ALBUMIN SERPL ELPH-MCNC: 4.7 G/DL
ALP BLD-CCNC: 54 U/L
ALT SERPL-CCNC: 14 U/L
ANION GAP SERPL CALC-SCNC: 14 MMOL/L
APPEARANCE: CLEAR
AST SERPL-CCNC: 18 U/L
BASOPHILS # BLD AUTO: 0.06 K/UL
BASOPHILS NFR BLD AUTO: 1.4 %
BILIRUB SERPL-MCNC: 0.4 MG/DL
BILIRUBIN URINE: NEGATIVE
BLOOD URINE: NEGATIVE
BUN SERPL-MCNC: 16 MG/DL
C PEPTIDE SERPL-MCNC: 2.2 NG/ML
CALCIUM SERPL-MCNC: 9.7 MG/DL
CHLORIDE SERPL-SCNC: 104 MMOL/L
CHOLEST SERPL-MCNC: 175 MG/DL
CO2 SERPL-SCNC: 23 MMOL/L
COLOR: NORMAL
CREAT SERPL-MCNC: 0.86 MG/DL
EOSINOPHIL # BLD AUTO: 0.07 K/UL
EOSINOPHIL NFR BLD AUTO: 1.6 %
ESTIMATED AVERAGE GLUCOSE: 111 MG/DL
FERRITIN SERPL-MCNC: 128 NG/ML
FOLATE SERPL-MCNC: >20 NG/ML
GLUCOSE QUALITATIVE U: NEGATIVE
GLUCOSE SERPL-MCNC: 91 MG/DL
HBA1C MFR BLD HPLC: 5.5 %
HCT VFR BLD CALC: 45.8 %
HDLC SERPL-MCNC: 79 MG/DL
HGB BLD-MCNC: 13.9 G/DL
IMM GRANULOCYTES NFR BLD AUTO: 0.2 %
IRON SATN MFR SERPL: 21 %
IRON SERPL-MCNC: 69 UG/DL
KETONES URINE: NEGATIVE
LDLC SERPL CALC-MCNC: 76 MG/DL
LEUKOCYTE ESTERASE URINE: ABNORMAL
LYMPHOCYTES # BLD AUTO: 1.57 K/UL
LYMPHOCYTES NFR BLD AUTO: 35.4 %
MAN DIFF?: NORMAL
MCHC RBC-ENTMCNC: 29.2 PG
MCHC RBC-ENTMCNC: 30.3 GM/DL
MCV RBC AUTO: 96.2 FL
MONOCYTES # BLD AUTO: 0.44 K/UL
MONOCYTES NFR BLD AUTO: 9.9 %
NEUTROPHILS # BLD AUTO: 2.29 K/UL
NEUTROPHILS NFR BLD AUTO: 51.5 %
NITRITE URINE: NEGATIVE
NONHDLC SERPL-MCNC: 96 MG/DL
PH URINE: 5.5
PLATELET # BLD AUTO: 273 K/UL
POTASSIUM SERPL-SCNC: 4.6 MMOL/L
PROT SERPL-MCNC: 7.2 G/DL
PROTEIN URINE: NEGATIVE
RBC # BLD: 4.76 M/UL
RBC # FLD: 14.6 %
SODIUM SERPL-SCNC: 141 MMOL/L
SPECIFIC GRAVITY URINE: 1.01
T4 SERPL-MCNC: 9.2 UG/DL
TIBC SERPL-MCNC: 330 UG/DL
TRIGL SERPL-MCNC: 100 MG/DL
TSH SERPL-ACNC: 2.29 UIU/ML
UIBC SERPL-MCNC: 262 UG/DL
UROBILINOGEN URINE: NORMAL
VIT B12 SERPL-MCNC: 930 PG/ML
WBC # FLD AUTO: 4.44 K/UL

## 2021-05-04 LAB
CREAT SPEC-SCNC: 99 MG/DL
MICROALBUMIN 24H UR DL<=1MG/L-MCNC: 1.3 MG/DL
MICROALBUMIN/CREAT 24H UR-RTO: 13 MG/G

## 2021-05-05 ENCOUNTER — APPOINTMENT (OUTPATIENT)
Dept: OBGYN | Facility: CLINIC | Age: 66
End: 2021-05-05
Payer: MEDICARE

## 2021-05-05 VITALS
BODY MASS INDEX: 28.32 KG/M2 | DIASTOLIC BLOOD PRESSURE: 70 MMHG | SYSTOLIC BLOOD PRESSURE: 120 MMHG | WEIGHT: 144.25 LBS | HEIGHT: 60 IN

## 2021-05-05 DIAGNOSIS — Z01.419 ENCOUNTER FOR GYNECOLOGICAL EXAMINATION (GENERAL) (ROUTINE) W/OUT ABNORMAL FINDINGS: ICD-10-CM

## 2021-05-05 DIAGNOSIS — Z86.39 PERSONAL HISTORY OF OTHER ENDOCRINE, NUTRITIONAL AND METABOLIC DISEASE: ICD-10-CM

## 2021-05-05 PROCEDURE — 99072 ADDL SUPL MATRL&STAF TM PHE: CPT

## 2021-05-05 PROCEDURE — 99212 OFFICE O/P EST SF 10 MIN: CPT | Mod: 25

## 2021-05-05 PROCEDURE — 99397 PER PM REEVAL EST PAT 65+ YR: CPT

## 2021-05-05 NOTE — HISTORY OF PRESENT ILLNESS
[Menarche Age: ____] : age at menarche was [unfilled] [N] : Patient is not sexually active [Menopause Age: ____] : age at menopause was [unfilled] [PGHxTotal] : 3 [[facility]] : 2 [PGHxPremature] : 0 [PGHxAbortions] : 1 [[facility]Living] : 2 [PGHxABInduced] : 1 [PGHxABSpont] : 0 [PGHxEctopic] : 0 [PGHxMultBirths] : 0

## 2021-05-05 NOTE — PHYSICAL EXAM
[Appropriately responsive] : appropriately responsive [Alert] : alert [No Acute Distress] : no acute distress [Soft] : soft [Non-tender] : non-tender [Non-distended] : non-distended [No HSM] : No HSM [No Lesions] : no lesions [No Mass] : no mass [Oriented x3] : oriented x3 [Examination Of The Breasts] : a normal appearance [No Masses] : no breast masses were palpable [Vulvar Atrophy] : vulvar atrophy [Labia Majora] : normal [Labia Minora] : normal [Normal] : normal [Atrophy] : atrophy [Cystocele] : a cystocele [Rectocele] : a rectocele [Absent] : absent [Uterine Adnexae] : absent [No Tenderness] : no tenderness [Nl Sphincter Tone] : normal sphincter tone

## 2021-08-19 ENCOUNTER — APPOINTMENT (OUTPATIENT)
Dept: FAMILY MEDICINE | Facility: CLINIC | Age: 66
End: 2021-08-19
Payer: MEDICARE

## 2021-08-19 VITALS
TEMPERATURE: 97.9 F | RESPIRATION RATE: 16 BRPM | BODY MASS INDEX: 28.47 KG/M2 | HEART RATE: 87 BPM | WEIGHT: 145 LBS | OXYGEN SATURATION: 98 % | HEIGHT: 60 IN | SYSTOLIC BLOOD PRESSURE: 126 MMHG | DIASTOLIC BLOOD PRESSURE: 80 MMHG

## 2021-08-19 PROCEDURE — 99214 OFFICE O/P EST MOD 30 MIN: CPT | Mod: 25

## 2021-08-19 PROCEDURE — 69209 REMOVE IMPACTED EAR WAX UNI: CPT

## 2021-08-19 RX ORDER — CEFPROZIL 500 MG/1
500 TABLET ORAL
Qty: 14 | Refills: 1 | Status: COMPLETED | COMMUNITY
Start: 2021-04-29 | End: 2021-08-19

## 2021-09-10 ENCOUNTER — RX RENEWAL (OUTPATIENT)
Age: 66
End: 2021-09-10

## 2021-09-20 ENCOUNTER — RX RENEWAL (OUTPATIENT)
Age: 66
End: 2021-09-20

## 2021-10-01 ENCOUNTER — APPOINTMENT (OUTPATIENT)
Dept: FAMILY MEDICINE | Facility: CLINIC | Age: 66
End: 2021-10-01
Payer: MEDICARE

## 2021-10-01 ENCOUNTER — MED ADMIN CHARGE (OUTPATIENT)
Age: 66
End: 2021-10-01

## 2021-10-01 VITALS — TEMPERATURE: 98.7 F | SYSTOLIC BLOOD PRESSURE: 132 MMHG | DIASTOLIC BLOOD PRESSURE: 80 MMHG

## 2021-10-01 PROCEDURE — G0008: CPT

## 2021-10-01 PROCEDURE — 90686 IIV4 VACC NO PRSV 0.5 ML IM: CPT

## 2021-10-01 NOTE — COUNSELING
[Fall prevention counseling provided] : Fall prevention counseling provided [Adequate lighting] : Adequate lighting [No throw rugs] : No throw rugs [Use proper foot wear] : Use proper foot wear [Behavioral health counseling provided] : Behavioral health counseling provided [Sleep ___ hours/day] : Sleep [unfilled] hours/day [Engage in a relaxing activity] : Engage in a relaxing activity [Plan in advance] : Plan in advance [AUDIT-C Screening administered and reviewed] : AUDIT-C Screening administered and reviewed [Encouraged to maintain food diary] : Encouraged to maintain food diary [Weigh Self Weekly] : weigh self weekly [Decrease Portions] : decrease portions [None] : None [Good understanding] : Patient has a good understanding of lifestyle changes and steps needed to achieve self management goal [FreeTextEntry2] : Never Smoked

## 2021-10-01 NOTE — PHYSICAL EXAM
[Well Nourished] : well nourished [Well Developed] : well developed [Well-Appearing] : well-appearing [Normal Sclera/Conjunctiva] : normal sclera/conjunctiva [PERRL] : pupils equal round and reactive to light [EOMI] : extraocular movements intact [Normal Outer Ear/Nose] : the outer ears and nose were normal in appearance [Normal Oropharynx] : the oropharynx was normal [No JVD] : no jugular venous distention [No Lymphadenopathy] : no lymphadenopathy [Supple] : supple [Thyroid Normal, No Nodules] : the thyroid was normal and there were no nodules present [No Respiratory Distress] : no respiratory distress  [No Accessory Muscle Use] : no accessory muscle use [Clear to Auscultation] : lungs were clear to auscultation bilaterally [Normal Rate] : normal rate  [Regular Rhythm] : with a regular rhythm [Normal S1, S2] : normal S1 and S2 [No Murmur] : no murmur heard [No Carotid Bruits] : no carotid bruits [No Abdominal Bruit] : a ~M bruit was not heard ~T in the abdomen [No Varicosities] : no varicosities [Pedal Pulses Present] : the pedal pulses are present [No Edema] : there was no peripheral edema [No Palpable Aorta] : no palpable aorta [No Extremity Clubbing/Cyanosis] : no extremity clubbing/cyanosis [Soft] : abdomen soft [Non Tender] : non-tender [Non-distended] : non-distended [No Masses] : no abdominal mass palpated [No HSM] : no HSM [Normal Bowel Sounds] : normal bowel sounds [Normal Posterior Cervical Nodes] : no posterior cervical lymphadenopathy [Normal Anterior Cervical Nodes] : no anterior cervical lymphadenopathy [No CVA Tenderness] : no CVA  tenderness [No Spinal Tenderness] : no spinal tenderness [No Joint Swelling] : no joint swelling [Grossly Normal Strength/Tone] : grossly normal strength/tone [No Rash] : no rash [Coordination Grossly Intact] : coordination grossly intact [No Focal Deficits] : no focal deficits [Normal Gait] : normal gait [Deep Tendon Reflexes (DTR)] : deep tendon reflexes were 2+ and symmetric [Normal Affect] : the affect was normal [Normal Insight/Judgement] : insight and judgment were intact [de-identified] : Overweight

## 2021-10-01 NOTE — HEALTH RISK ASSESSMENT
[Yes] : Yes [Monthly or less (1 pt)] : Monthly or less (1 point) [1 or 2 (0 pts)] : 1 or 2 (0 points) [Never (0 pts)] : Never (0 points) [No] : In the past 12 months have you used drugs other than those required for medical reasons? No [No falls in past year] : Patient reported no falls in the past year [0] : 2) Feeling down, depressed, or hopeless: Not at all (0) [] : No [de-identified] : Never Smoked [Audit-CScore] : 0 [de-identified] : Average [de-identified] : Average

## 2021-10-01 NOTE — HEALTH RISK ASSESSMENT
[Yes] : Yes [Monthly or less (1 pt)] : Monthly or less (1 point) [1 or 2 (0 pts)] : 1 or 2 (0 points) [Never (0 pts)] : Never (0 points) [No] : In the past 12 months have you used drugs other than those required for medical reasons? No [No falls in past year] : Patient reported no falls in the past year [0] : 2) Feeling down, depressed, or hopeless: Not at all (0) [] : No [de-identified] : Never Smoked [Audit-CScore] : 0 [de-identified] : Average [de-identified] : Average

## 2021-10-01 NOTE — PLAN
[FreeTextEntry1] : Patient given High Dose Flu Vaccine in left deltoid by RN, she tolerated well.  Denies any adverse reaction to injections.  Education sheet given.  YANIRA, RN

## 2021-10-01 NOTE — PHYSICAL EXAM
[Well Nourished] : well nourished [Well Developed] : well developed [Well-Appearing] : well-appearing [Normal Sclera/Conjunctiva] : normal sclera/conjunctiva [PERRL] : pupils equal round and reactive to light [EOMI] : extraocular movements intact [Normal Outer Ear/Nose] : the outer ears and nose were normal in appearance [Normal Oropharynx] : the oropharynx was normal [No JVD] : no jugular venous distention [No Lymphadenopathy] : no lymphadenopathy [Supple] : supple [Thyroid Normal, No Nodules] : the thyroid was normal and there were no nodules present [No Respiratory Distress] : no respiratory distress  [No Accessory Muscle Use] : no accessory muscle use [Clear to Auscultation] : lungs were clear to auscultation bilaterally [Normal Rate] : normal rate  [Regular Rhythm] : with a regular rhythm [Normal S1, S2] : normal S1 and S2 [No Murmur] : no murmur heard [No Carotid Bruits] : no carotid bruits [No Abdominal Bruit] : a ~M bruit was not heard ~T in the abdomen [No Varicosities] : no varicosities [Pedal Pulses Present] : the pedal pulses are present [No Edema] : there was no peripheral edema [No Palpable Aorta] : no palpable aorta [No Extremity Clubbing/Cyanosis] : no extremity clubbing/cyanosis [Soft] : abdomen soft [Non Tender] : non-tender [Non-distended] : non-distended [No Masses] : no abdominal mass palpated [No HSM] : no HSM [Normal Bowel Sounds] : normal bowel sounds [Normal Posterior Cervical Nodes] : no posterior cervical lymphadenopathy [Normal Anterior Cervical Nodes] : no anterior cervical lymphadenopathy [No CVA Tenderness] : no CVA  tenderness [No Spinal Tenderness] : no spinal tenderness [No Joint Swelling] : no joint swelling [Grossly Normal Strength/Tone] : grossly normal strength/tone [No Rash] : no rash [Coordination Grossly Intact] : coordination grossly intact [No Focal Deficits] : no focal deficits [Normal Gait] : normal gait [Deep Tendon Reflexes (DTR)] : deep tendon reflexes were 2+ and symmetric [Normal Affect] : the affect was normal [Normal Insight/Judgement] : insight and judgment were intact [de-identified] : Overweight

## 2021-12-06 ENCOUNTER — APPOINTMENT (OUTPATIENT)
Dept: FAMILY MEDICINE | Facility: CLINIC | Age: 66
End: 2021-12-06

## 2021-12-31 ENCOUNTER — RX RENEWAL (OUTPATIENT)
Age: 66
End: 2021-12-31

## 2022-02-06 ENCOUNTER — TRANSCRIPTION ENCOUNTER (OUTPATIENT)
Age: 67
End: 2022-02-06

## 2022-02-07 ENCOUNTER — APPOINTMENT (OUTPATIENT)
Dept: FAMILY MEDICINE | Facility: CLINIC | Age: 67
End: 2022-02-07
Payer: MEDICARE

## 2022-02-07 VITALS
TEMPERATURE: 98.1 F | RESPIRATION RATE: 16 BRPM | SYSTOLIC BLOOD PRESSURE: 130 MMHG | WEIGHT: 144 LBS | DIASTOLIC BLOOD PRESSURE: 78 MMHG | BODY MASS INDEX: 28.27 KG/M2 | HEIGHT: 60 IN | HEART RATE: 90 BPM | OXYGEN SATURATION: 98 %

## 2022-02-07 DIAGNOSIS — H66.93 OTITIS MEDIA, UNSPECIFIED, BILATERAL: ICD-10-CM

## 2022-02-07 PROCEDURE — 69210 REMOVE IMPACTED EAR WAX UNI: CPT

## 2022-02-07 PROCEDURE — 99214 OFFICE O/P EST MOD 30 MIN: CPT | Mod: 25

## 2022-04-01 ENCOUNTER — LABORATORY RESULT (OUTPATIENT)
Age: 67
End: 2022-04-01

## 2022-04-01 ENCOUNTER — APPOINTMENT (OUTPATIENT)
Dept: FAMILY MEDICINE | Facility: CLINIC | Age: 67
End: 2022-04-01
Payer: MEDICARE

## 2022-04-01 VITALS
DIASTOLIC BLOOD PRESSURE: 76 MMHG | HEIGHT: 60 IN | TEMPERATURE: 97.2 F | SYSTOLIC BLOOD PRESSURE: 130 MMHG | RESPIRATION RATE: 16 BRPM | HEART RATE: 76 BPM | BODY MASS INDEX: 27.88 KG/M2 | OXYGEN SATURATION: 97 % | WEIGHT: 142 LBS

## 2022-04-01 PROCEDURE — 99214 OFFICE O/P EST MOD 30 MIN: CPT | Mod: 25

## 2022-04-01 PROCEDURE — 36415 COLL VENOUS BLD VENIPUNCTURE: CPT

## 2022-04-03 LAB
25(OH)D3 SERPL-MCNC: 68 NG/ML
ALBUMIN SERPL ELPH-MCNC: 4.7 G/DL
ALP BLD-CCNC: 48 U/L
ALT SERPL-CCNC: 14 U/L
ANION GAP SERPL CALC-SCNC: 14 MMOL/L
APPEARANCE: CLEAR
AST SERPL-CCNC: 14 U/L
BASOPHILS # BLD AUTO: 0.05 K/UL
BASOPHILS NFR BLD AUTO: 1 %
BILIRUB SERPL-MCNC: 0.8 MG/DL
BILIRUBIN URINE: NEGATIVE
BLOOD URINE: NEGATIVE
BUN SERPL-MCNC: 15 MG/DL
C PEPTIDE SERPL-MCNC: 2.7 NG/ML
CALCIUM SERPL-MCNC: 10 MG/DL
CHLORIDE SERPL-SCNC: 102 MMOL/L
CHOLEST SERPL-MCNC: 179 MG/DL
CO2 SERPL-SCNC: 24 MMOL/L
COLOR: NORMAL
COVID-19 NUCLEOCAPSID  GAM ANTIBODY INTERPRETATION: NEGATIVE
COVID-19 SPIKE DOMAIN ANTIBODY INTERPRETATION: POSITIVE
CREAT SERPL-MCNC: 0.85 MG/DL
CREAT SPEC-SCNC: 86 MG/DL
EGFR: 75 ML/MIN/1.73M2
EOSINOPHIL # BLD AUTO: 0.06 K/UL
EOSINOPHIL NFR BLD AUTO: 1.2 %
ESTIMATED AVERAGE GLUCOSE: 108 MG/DL
FERRITIN SERPL-MCNC: 134 NG/ML
FOLATE SERPL-MCNC: >20 NG/ML
GLUCOSE QUALITATIVE U: NEGATIVE
GLUCOSE SERPL-MCNC: 91 MG/DL
HBA1C MFR BLD HPLC: 5.4 %
HCT VFR BLD CALC: 44.4 %
HDLC SERPL-MCNC: 75 MG/DL
HGB BLD-MCNC: 13.7 G/DL
IMM GRANULOCYTES NFR BLD AUTO: 0.2 %
IRON SATN MFR SERPL: 37 %
IRON SERPL-MCNC: 122 UG/DL
KETONES URINE: NEGATIVE
LDLC SERPL CALC-MCNC: 85 MG/DL
LEUKOCYTE ESTERASE URINE: ABNORMAL
LYMPHOCYTES # BLD AUTO: 1.59 K/UL
LYMPHOCYTES NFR BLD AUTO: 32.8 %
MAN DIFF?: NORMAL
MCHC RBC-ENTMCNC: 28.8 PG
MCHC RBC-ENTMCNC: 30.9 GM/DL
MCV RBC AUTO: 93.5 FL
MICROALBUMIN 24H UR DL<=1MG/L-MCNC: <1.2 MG/DL
MICROALBUMIN/CREAT 24H UR-RTO: NORMAL MG/G
MONOCYTES # BLD AUTO: 0.41 K/UL
MONOCYTES NFR BLD AUTO: 8.5 %
NEUTROPHILS # BLD AUTO: 2.73 K/UL
NEUTROPHILS NFR BLD AUTO: 56.3 %
NITRITE URINE: NEGATIVE
NONHDLC SERPL-MCNC: 104 MG/DL
PH URINE: 6
PLATELET # BLD AUTO: 267 K/UL
POTASSIUM SERPL-SCNC: 5.2 MMOL/L
PROT SERPL-MCNC: 7.1 G/DL
PROTEIN URINE: NEGATIVE
RBC # BLD: 4.75 M/UL
RBC # FLD: 14.6 %
SARS-COV-2 AB SERPL IA-ACNC: >250 U/ML
SARS-COV-2 AB SERPL QL IA: 0.09 INDEX
SODIUM SERPL-SCNC: 140 MMOL/L
SPECIFIC GRAVITY URINE: 1.01
T4 SERPL-MCNC: 8.6 UG/DL
TIBC SERPL-MCNC: 329 UG/DL
TRIGL SERPL-MCNC: 94 MG/DL
TSH SERPL-ACNC: 2.32 UIU/ML
UIBC SERPL-MCNC: 207 UG/DL
UROBILINOGEN URINE: NORMAL
VIT B12 SERPL-MCNC: 932 PG/ML
WBC # FLD AUTO: 4.85 K/UL

## 2022-04-15 ENCOUNTER — APPOINTMENT (OUTPATIENT)
Dept: FAMILY MEDICINE | Facility: CLINIC | Age: 67
End: 2022-04-15
Payer: MEDICARE

## 2022-04-15 VITALS
DIASTOLIC BLOOD PRESSURE: 70 MMHG | TEMPERATURE: 97.5 F | BODY MASS INDEX: 27.88 KG/M2 | SYSTOLIC BLOOD PRESSURE: 136 MMHG | RESPIRATION RATE: 16 BRPM | HEART RATE: 72 BPM | OXYGEN SATURATION: 98 % | HEIGHT: 60 IN | WEIGHT: 142 LBS

## 2022-04-15 PROCEDURE — 99214 OFFICE O/P EST MOD 30 MIN: CPT

## 2022-05-10 ENCOUNTER — APPOINTMENT (OUTPATIENT)
Dept: OBGYN | Facility: CLINIC | Age: 67
End: 2022-05-10
Payer: MEDICARE

## 2022-05-10 VITALS
BODY MASS INDEX: 28.55 KG/M2 | DIASTOLIC BLOOD PRESSURE: 80 MMHG | WEIGHT: 145.44 LBS | SYSTOLIC BLOOD PRESSURE: 128 MMHG | HEIGHT: 60 IN

## 2022-05-10 VITALS
SYSTOLIC BLOOD PRESSURE: 128 MMHG | BODY MASS INDEX: 28.55 KG/M2 | HEIGHT: 60 IN | DIASTOLIC BLOOD PRESSURE: 80 MMHG | WEIGHT: 145.44 LBS

## 2022-05-10 PROCEDURE — 99397 PER PM REEVAL EST PAT 65+ YR: CPT

## 2022-05-10 RX ORDER — AMOXICILLIN AND CLAVULANATE POTASSIUM 875; 125 MG/1; MG/1
875-125 TABLET, COATED ORAL
Qty: 14 | Refills: 0 | Status: DISCONTINUED | COMMUNITY
Start: 2022-02-07 | End: 2022-05-10

## 2022-05-10 RX ORDER — CLINDAMYCIN PHOSPHATE 10 MG/ML
1 SOLUTION TOPICAL
Qty: 30 | Refills: 0 | Status: DISCONTINUED | COMMUNITY
Start: 2018-04-19 | End: 2022-05-10

## 2022-05-10 RX ORDER — DESLORATADINE 5 MG/1
5 TABLET ORAL
Qty: 30 | Refills: 3 | Status: DISCONTINUED | COMMUNITY
Start: 2018-02-01 | End: 2022-05-10

## 2022-05-10 NOTE — HISTORY OF PRESENT ILLNESS
[N] : Patient is not sexually active [Y] : Positive pregnancy history [Menarche Age: ____] : age at menarche was [unfilled] [Menopause Age: ____] : age at menopause was [unfilled] [Mammogramdate] : 07/21 [PapSmeardate] : 05/21 [BoneDensityDate] : 07/19 [PGHxTotal] : 3 [[facility]Full[facility]] : 2 [PGHxPremature] : 0 [PGHxAbortions] : 1 [[facility]xLiving] : 2 [PGHxABInduced] : 1 [PGHxABSpont] : 0 [PGHxEctopic] : 0 [PGHxMultBirths] : 0

## 2022-05-10 NOTE — PHYSICAL EXAM
[Chaperone Present] : A chaperone was present in the examining room during all aspects of the physical examination [Appropriately responsive] : appropriately responsive [Alert] : alert [No Acute Distress] : no acute distress [Soft] : soft [Non-tender] : non-tender [Non-distended] : non-distended [No HSM] : No HSM [No Lesions] : no lesions [No Mass] : no mass [Oriented x3] : oriented x3 [Examination Of The Breasts] : a normal appearance [No Masses] : no breast masses were palpable [Vulvar Atrophy] : vulvar atrophy [Labia Majora] : normal [Labia Minora] : normal [Normal] : normal [Atrophy] : atrophy [Cystocele] : a cystocele [Rectocele] : a rectocele [Absent] : absent [Uterine Adnexae] : absent [No Tenderness] : no tenderness [Nl Sphincter Tone] : normal sphincter tone

## 2022-08-15 ENCOUNTER — RX RENEWAL (OUTPATIENT)
Age: 67
End: 2022-08-15

## 2022-10-14 ENCOUNTER — LABORATORY RESULT (OUTPATIENT)
Age: 67
End: 2022-10-14

## 2022-10-14 ENCOUNTER — APPOINTMENT (OUTPATIENT)
Dept: FAMILY MEDICINE | Facility: CLINIC | Age: 67
End: 2022-10-14

## 2022-10-14 VITALS
SYSTOLIC BLOOD PRESSURE: 124 MMHG | DIASTOLIC BLOOD PRESSURE: 76 MMHG | HEART RATE: 79 BPM | RESPIRATION RATE: 12 BRPM | OXYGEN SATURATION: 98 % | HEIGHT: 60 IN | WEIGHT: 147 LBS | BODY MASS INDEX: 28.86 KG/M2 | TEMPERATURE: 98.1 F

## 2022-10-14 PROCEDURE — 90662 IIV NO PRSV INCREASED AG IM: CPT

## 2022-10-14 PROCEDURE — 99214 OFFICE O/P EST MOD 30 MIN: CPT | Mod: 25

## 2022-10-14 PROCEDURE — G0008: CPT

## 2022-10-14 PROCEDURE — 36415 COLL VENOUS BLD VENIPUNCTURE: CPT

## 2022-10-17 LAB
25(OH)D3 SERPL-MCNC: 68 NG/ML
ALBUMIN SERPL ELPH-MCNC: 4.6 G/DL
ALP BLD-CCNC: 55 U/L
ALT SERPL-CCNC: 12 U/L
ANION GAP SERPL CALC-SCNC: 15 MMOL/L
APPEARANCE: CLEAR
AST SERPL-CCNC: 15 U/L
BASOPHILS # BLD AUTO: 0.07 K/UL
BASOPHILS NFR BLD AUTO: 1.3 %
BILIRUB SERPL-MCNC: 0.3 MG/DL
BILIRUBIN URINE: NEGATIVE
BLOOD URINE: NEGATIVE
BUN SERPL-MCNC: 21 MG/DL
C PEPTIDE SERPL-MCNC: 3 NG/ML
CALCIUM SERPL-MCNC: 9.6 MG/DL
CHLORIDE SERPL-SCNC: 104 MMOL/L
CHOLEST SERPL-MCNC: 168 MG/DL
CO2 SERPL-SCNC: 21 MMOL/L
COLOR: NORMAL
COVID-19 NUCLEOCAPSID  GAM ANTIBODY INTERPRETATION: NEGATIVE
COVID-19 SPIKE DOMAIN ANTIBODY INTERPRETATION: POSITIVE
CREAT SERPL-MCNC: 0.91 MG/DL
CREAT SPEC-SCNC: 115 MG/DL
EGFR: 69 ML/MIN/1.73M2
EOSINOPHIL # BLD AUTO: 0.11 K/UL
EOSINOPHIL NFR BLD AUTO: 2 %
ESTIMATED AVERAGE GLUCOSE: 114 MG/DL
FERRITIN SERPL-MCNC: 120 NG/ML
FOLATE SERPL-MCNC: >20 NG/ML
GLUCOSE QUALITATIVE U: NEGATIVE
GLUCOSE SERPL-MCNC: 95 MG/DL
HBA1C MFR BLD HPLC: 5.6 %
HCT VFR BLD CALC: 42.9 %
HDLC SERPL-MCNC: 75 MG/DL
HGB BLD-MCNC: 13.7 G/DL
IMM GRANULOCYTES NFR BLD AUTO: 0.2 %
IRON SATN MFR SERPL: 23 %
IRON SERPL-MCNC: 73 UG/DL
KETONES URINE: NEGATIVE
LDLC SERPL CALC-MCNC: 76 MG/DL
LEUKOCYTE ESTERASE URINE: ABNORMAL
LYMPHOCYTES # BLD AUTO: 2.04 K/UL
LYMPHOCYTES NFR BLD AUTO: 37.4 %
MAN DIFF?: NORMAL
MCHC RBC-ENTMCNC: 29 PG
MCHC RBC-ENTMCNC: 31.9 GM/DL
MCV RBC AUTO: 90.7 FL
MICROALBUMIN 24H UR DL<=1MG/L-MCNC: <1.2 MG/DL
MICROALBUMIN/CREAT 24H UR-RTO: NORMAL MG/G
MONOCYTES # BLD AUTO: 0.46 K/UL
MONOCYTES NFR BLD AUTO: 8.4 %
NEUTROPHILS # BLD AUTO: 2.76 K/UL
NEUTROPHILS NFR BLD AUTO: 50.7 %
NITRITE URINE: NEGATIVE
NONHDLC SERPL-MCNC: 93 MG/DL
PH URINE: 6
PLATELET # BLD AUTO: 273 K/UL
POTASSIUM SERPL-SCNC: 5 MMOL/L
PROT SERPL-MCNC: 6.9 G/DL
PROTEIN URINE: NORMAL
RBC # BLD: 4.73 M/UL
RBC # FLD: 14.2 %
SARS-COV-2 AB SERPL IA-ACNC: >250 U/ML
SARS-COV-2 AB SERPL QL IA: 0.1 INDEX
SODIUM SERPL-SCNC: 140 MMOL/L
SPECIFIC GRAVITY URINE: 1.02
T4 SERPL-MCNC: 9.2 UG/DL
TIBC SERPL-MCNC: 322 UG/DL
TRIGL SERPL-MCNC: 83 MG/DL
TSH SERPL-ACNC: 2.38 UIU/ML
UIBC SERPL-MCNC: 249 UG/DL
UROBILINOGEN URINE: NORMAL
VIT B12 SERPL-MCNC: 918 PG/ML
WBC # FLD AUTO: 5.45 K/UL

## 2022-10-24 ENCOUNTER — RX RENEWAL (OUTPATIENT)
Age: 67
End: 2022-10-24

## 2022-10-28 ENCOUNTER — APPOINTMENT (OUTPATIENT)
Dept: FAMILY MEDICINE | Facility: CLINIC | Age: 67
End: 2022-10-28

## 2022-10-28 VITALS
BODY MASS INDEX: 28.86 KG/M2 | WEIGHT: 147 LBS | DIASTOLIC BLOOD PRESSURE: 80 MMHG | SYSTOLIC BLOOD PRESSURE: 124 MMHG | TEMPERATURE: 97.9 F | RESPIRATION RATE: 16 BRPM | HEART RATE: 84 BPM | HEIGHT: 60 IN | OXYGEN SATURATION: 98 %

## 2022-10-28 PROCEDURE — 99214 OFFICE O/P EST MOD 30 MIN: CPT

## 2022-11-08 ENCOUNTER — OFFICE (OUTPATIENT)
Dept: URBAN - METROPOLITAN AREA CLINIC 104 | Facility: CLINIC | Age: 67
Setting detail: OPHTHALMOLOGY
End: 2022-11-08
Payer: COMMERCIAL

## 2022-11-08 DIAGNOSIS — H01.005: ICD-10-CM

## 2022-11-08 DIAGNOSIS — H01.001: ICD-10-CM

## 2022-11-08 DIAGNOSIS — H26.493: ICD-10-CM

## 2022-11-08 DIAGNOSIS — H53.433: ICD-10-CM

## 2022-11-08 DIAGNOSIS — H35.033: ICD-10-CM

## 2022-11-08 DIAGNOSIS — H01.004: ICD-10-CM

## 2022-11-08 DIAGNOSIS — H43.813: ICD-10-CM

## 2022-11-08 DIAGNOSIS — H01.002: ICD-10-CM

## 2022-11-08 DIAGNOSIS — H11.823: ICD-10-CM

## 2022-11-08 PROCEDURE — 92014 COMPRE OPH EXAM EST PT 1/>: CPT | Performed by: SPECIALIST

## 2022-11-08 ASSESSMENT — CONFRONTATIONAL VISUAL FIELD TEST (CVF)
OD_FINDINGS: FULL
OS_FINDINGS: FULL

## 2022-11-08 ASSESSMENT — REFRACTION_AUTOREFRACTION
OD_CYLINDER: -0.25
OD_AXIS: 020
OD_SPHERE: -2.00
OS_CYLINDER: 0.00
OS_SPHERE: -2.00

## 2022-11-08 ASSESSMENT — REFRACTION_CURRENTRX
OS_OVR_VA: 20/
OD_SPHERE: -2.00
OD_CYLINDER: -0.25
OD_OVR_VA: 20/
OS_CYLINDER: -0.25
OS_SPHERE: -1.75
OS_AXIS: 107
OD_VPRISM_DIRECTION: SV
OS_VPRISM_DIRECTION: SV
OD_AXIS: 026

## 2022-11-08 ASSESSMENT — KERATOMETRY
OD_AXISANGLE_DEGREES: 105
OS_AXISANGLE_DEGREES: 118
OS_K1POWER_DIOPTERS: 43.72
OS_K2POWER_DIOPTERS: 43.83
OD_K2POWER_DIOPTERS: 44.18
OD_K1POWER_DIOPTERS: 43.60

## 2022-11-08 ASSESSMENT — SPHEQUIV_DERIVED
OD_SPHEQUIV: -2.125
OS_SPHEQUIV: -2

## 2022-11-08 ASSESSMENT — AXIALLENGTH_DERIVED
OS_AL: 24.29
OD_AL: 24.297

## 2022-11-08 ASSESSMENT — VISUAL ACUITY
OD_BCVA: 20/20
OS_BCVA: 20/20-1

## 2022-11-08 ASSESSMENT — TONOMETRY
OD_IOP_MMHG: 19
OS_IOP_MMHG: 19

## 2023-01-19 ENCOUNTER — APPOINTMENT (OUTPATIENT)
Dept: FAMILY MEDICINE | Facility: CLINIC | Age: 68
End: 2023-01-19
Payer: MEDICARE

## 2023-01-19 VITALS
HEART RATE: 80 BPM | BODY MASS INDEX: 28.27 KG/M2 | HEIGHT: 60 IN | TEMPERATURE: 97 F | OXYGEN SATURATION: 98 % | RESPIRATION RATE: 14 BRPM | WEIGHT: 144 LBS | SYSTOLIC BLOOD PRESSURE: 140 MMHG | DIASTOLIC BLOOD PRESSURE: 80 MMHG

## 2023-01-19 DIAGNOSIS — M54.17 RADICULOPATHY, LUMBOSACRAL REGION: ICD-10-CM

## 2023-01-19 DIAGNOSIS — M54.12 RADICULOPATHY, CERVICAL REGION: ICD-10-CM

## 2023-01-19 PROCEDURE — 99214 OFFICE O/P EST MOD 30 MIN: CPT

## 2023-01-26 ENCOUNTER — APPOINTMENT (OUTPATIENT)
Dept: ORTHOPEDIC SURGERY | Facility: CLINIC | Age: 68
End: 2023-01-26
Payer: MEDICARE

## 2023-01-26 VITALS
HEART RATE: 83 BPM | BODY MASS INDEX: 28.27 KG/M2 | DIASTOLIC BLOOD PRESSURE: 85 MMHG | SYSTOLIC BLOOD PRESSURE: 129 MMHG | WEIGHT: 144 LBS | HEIGHT: 60 IN

## 2023-01-26 PROCEDURE — 99203 OFFICE O/P NEW LOW 30 MIN: CPT

## 2023-01-26 PROCEDURE — 73030 X-RAY EXAM OF SHOULDER: CPT | Mod: RT

## 2023-01-26 NOTE — DISCUSSION/SUMMARY
[de-identified] : Assessment: Patient is a 67-year-old female with right shoulder rotator cuff tendinitis in the setting of very mild osteoarthritis\par \par Plan: I had a long discussion with the patient today regarding the nature of their diagnosis and treatment plan. We discussed the risks and benefits of no treatment as well as nonoperative and operative treatments.  I reviewed the x-rays with the patient today that revealed mild osteoarthritic changes and no other acute findings.  On examination today she has good range of motion and strength with pain over the tuberosity.  At this time I am recommending conservative treatment including ice, heat, rest, Mobic 15 mg once daily with food for pain and inflammation, physical therapy for symptomatic relief.  GI precautions were discussed and prescriptions were provided today.  I am also recommending she follow-up with a spine specialist for her neck and back because she has neck and back issues that are chronic.  She will follow-up with us in 6 to 8 weeks for repeat evaluation if her shoulder pain persist despite conservative treatments we may consider injection versus MRI at that time.  Patient seen and examined with Dr. Xiao today.\par  \par The patient verbalizes their understanding and agrees with the plan.  All questions were answered to their satisfaction.

## 2023-01-26 NOTE — REVIEW OF SYSTEMS
[Joint Pain] : joint pain [Joint Stiffness] : joint stiffness [Joint Swelling] : joint swelling [Negative] : Heme/Lymph [FreeTextEntry9] : right shoulder pain

## 2023-01-26 NOTE — HISTORY OF PRESENT ILLNESS
[de-identified] : 01/26/2023 : KRISTEN CARRILLO  is a 67 year  old female who presents to the office for evaluation of her right shoulder today.  She states that for several months now without any acute traumatic injury she started developing pain in her right shoulder and upper arm.  She states recently it started radiating down to the forearm and she started getting intermittent tingling into the bilateral hands but mostly the pain is located about the right upper arm.  She states she does not have really any neck pain currently.  She states the pain can be sharp in nature and is worse with certain activities and movements of the arm and alleviated with rest.  She states she has never seen a doctor for this before but is here for specialist opinion today because of her continued pain.  She denies any numbness or tingling currently.  She has no other complaints today.

## 2023-01-26 NOTE — PHYSICAL EXAM
[de-identified] : General:\par Awake, alert, no acute distress, Patient was cooperative and appropriate during the examination.\par \par The patient is of normal weight for height and age.\par \par Walks without an antalgic gait.\par \par Full, painless range of motion of the neck and back.\par \par Exam of the bilateral lower extremities is intact and symmetric with regards to dermatologic, vascular, and neurologic exam. Bilateral lower extremity sensation is grossly intact to light touch in the DP/SP/T/S/S nerve distributions. Intact DF/PF/EHL. BIlateral lower extremities warm and well-perfused with brisk capillary refill.\par \par \par Pulmonary:\par Regular, nonlabored breathing\par \par Abdomen:\par Soft, nontender, nondistended.\par \par Lymphatic:\par No evidence of axillary lymphadenopathy\par \par Right shoulder Exam:\par Physical exam of the shoulder demonstrates normal skin without signs of skin changes or abnormalities. No erythema, warmth, or joint effusion appreciated.  \par  \par Sensation intact light touch C5-T1\par Palpable radial pulse\par Radial/ulnar/median/axillary/musculocutaneous/AIN/PIN nerves grossly intact\par  \par Range of motion:\par Forward Flexion: 180\par Abduction: 150\par External Rotation: 60\par Internal Rotation: T7\par  \par Palpation:\par Not tender to palpation over the glenohumeral joint\par Exquisitely tender palpation over the rotator cuff insertion on the greater tuberosity\par Mildly tender to palpation over the AC joint\par Not tender to palpation of the biceps tendon/bicipital groove\par Mildly tender over the trapezial and periscapular muscles on the right side\par No cervical midline tenderness\par  \par Strength testing:\par Supraspinatus: 5/5\par Infraspinatus: 5/5\par Subscapularis: 5/5\par  \par Special test:\par Empty can test positive\par Cho impingement test positive\par Speeds test negative\par [place] test negative\par Lift-off test negative\par Bell-press test negative\par Cross-arm adduction test positive\par   [de-identified] : X-rays 4 views of the right shoulder taken in the office today on 1/26/2023 reveals no acute fracture dislocation with mild degenerative changes noted

## 2023-02-06 ENCOUNTER — RX RENEWAL (OUTPATIENT)
Age: 68
End: 2023-02-06

## 2023-04-07 ENCOUNTER — RX RENEWAL (OUTPATIENT)
Age: 68
End: 2023-04-07

## 2023-04-28 ENCOUNTER — APPOINTMENT (OUTPATIENT)
Dept: FAMILY MEDICINE | Facility: CLINIC | Age: 68
End: 2023-04-28
Payer: MEDICARE

## 2023-04-28 ENCOUNTER — LABORATORY RESULT (OUTPATIENT)
Age: 68
End: 2023-04-28

## 2023-04-28 VITALS
HEIGHT: 60 IN | OXYGEN SATURATION: 98 % | WEIGHT: 146 LBS | SYSTOLIC BLOOD PRESSURE: 124 MMHG | RESPIRATION RATE: 15 BRPM | TEMPERATURE: 98.7 F | BODY MASS INDEX: 28.66 KG/M2 | DIASTOLIC BLOOD PRESSURE: 62 MMHG | HEART RATE: 62 BPM

## 2023-04-28 DIAGNOSIS — M54.50 LOW BACK PAIN, UNSPECIFIED: ICD-10-CM

## 2023-04-28 PROCEDURE — 36415 COLL VENOUS BLD VENIPUNCTURE: CPT

## 2023-04-28 PROCEDURE — 99214 OFFICE O/P EST MOD 30 MIN: CPT | Mod: 25

## 2023-04-28 NOTE — COUNSELING
[Fall prevention counseling provided] : Fall prevention counseling provided [Adequate lighting] : Adequate lighting [No throw rugs] : No throw rugs [Use proper foot wear] : Use proper foot wear [Behavioral health counseling provided] : Behavioral health counseling provided [Sleep ___ hours/day] : Sleep [unfilled] hours/day [Engage in a relaxing activity] : Engage in a relaxing activity [Plan in advance] : Plan in advance [AUDIT-C Screening administered and reviewed] : AUDIT-C Screening administered and reviewed [Potential consequences of obesity discussed] : Potential consequences of obesity discussed [Benefits of weight loss discussed] : Benefits of weight loss discussed [Encouraged to increase physical activity] : Encouraged to increase physical activity [Weigh Self Weekly] : weigh self weekly [Decrease Portions] : decrease portions [None] : None [Good understanding] : Patient has a good understanding of lifestyle changes and steps needed to achieve self management goal [FreeTextEntry2] : Never Smoked

## 2023-04-28 NOTE — HEALTH RISK ASSESSMENT
[Yes] : Yes [Monthly or less (1 pt)] : Monthly or less (1 point) [1 or 2 (0 pts)] : 1 or 2 (0 points) [Never (0 pts)] : Never (0 points) [No] : In the past 12 months have you used drugs other than those required for medical reasons? No [No falls in past year] : Patient reported no falls in the past year [0] : 2) Feeling down, depressed, or hopeless: Not at all (0) [PHQ-2 Negative - No further assessment needed] : PHQ-2 Negative - No further assessment needed [Patient/Caregiver not ready to engage] : , patient/caregiver not ready to engage [I will adhere to the patient's wishes.] : I will adhere to the patient's wishes. [Time Spent: ___ minutes] : Time Spent: [unfilled] minutes [Never] : Never [Audit-CScore] : 0 [de-identified] : Average [de-identified] : Average [[facility]] : 8 [PCQ0Qxcsw] : 0 [AdvancecareDate] : 03/22

## 2023-04-28 NOTE — HISTORY OF PRESENT ILLNESS
[FreeTextEntry1] : Follow apt. up for Lab Testing [de-identified] : Follow apt. up for Lab Testing

## 2023-04-28 NOTE — PHYSICAL EXAM
[Well Nourished] : well nourished [Well Developed] : well developed [Well-Appearing] : well-appearing [Normal Sclera/Conjunctiva] : normal sclera/conjunctiva [PERRL] : pupils equal round and reactive to light [EOMI] : extraocular movements intact [Normal Outer Ear/Nose] : the outer ears and nose were normal in appearance [Normal Oropharynx] : the oropharynx was normal [No JVD] : no jugular venous distention [No Lymphadenopathy] : no lymphadenopathy [Supple] : supple [Thyroid Normal, No Nodules] : the thyroid was normal and there were no nodules present [No Respiratory Distress] : no respiratory distress  [No Accessory Muscle Use] : no accessory muscle use [Clear to Auscultation] : lungs were clear to auscultation bilaterally [Normal Rate] : normal rate  [Regular Rhythm] : with a regular rhythm [Normal S1, S2] : normal S1 and S2 [No Murmur] : no murmur heard [No Carotid Bruits] : no carotid bruits [No Abdominal Bruit] : a ~M bruit was not heard ~T in the abdomen [No Varicosities] : no varicosities [Pedal Pulses Present] : the pedal pulses are present [No Edema] : there was no peripheral edema [No Palpable Aorta] : no palpable aorta [No Extremity Clubbing/Cyanosis] : no extremity clubbing/cyanosis [Soft] : abdomen soft [Non Tender] : non-tender [Non-distended] : non-distended [No Masses] : no abdominal mass palpated [No HSM] : no HSM [Normal Bowel Sounds] : normal bowel sounds [No CVA Tenderness] : no CVA  tenderness [No Spinal Tenderness] : no spinal tenderness [No Joint Swelling] : no joint swelling [Grossly Normal Strength/Tone] : grossly normal strength/tone [No Rash] : no rash [Coordination Grossly Intact] : coordination grossly intact [No Focal Deficits] : no focal deficits [Normal Gait] : normal gait [Deep Tendon Reflexes (DTR)] : deep tendon reflexes were 2+ and symmetric [___/1] : [unfilled]/1   [___/3] : [unfilled]/3 [___/5] : [unfilled]/5 [___/4] : [unfilled]/4 [___/2] : [unfilled]/2 [___/8] : [unfilled]/8 [Normal] : Normal [Normal Insight/Judgement] : insight and judgment were intact [Normal Affect] : the affect was normal [Comprehensive Foot Exam Normal] : Right and left foot were examined and both feet are normal. No ulcers in either foot. Toes are normal and with full ROM.  Normal tactile sensation with monofilament testing throughout both feet [de-identified] : Overweight [TextBox_2] : yes [TextBox_4] : HS [SlumsTotal] : 30

## 2023-04-28 NOTE — ASSESSMENT
[FreeTextEntry1] : Overweight/Fatigue/Increased GFR - Labs for Fatigue and Dysmetabolism.\par HLD - Lipid Panel.\par Low Vit. D - Vit. D Level.\par COVID 19 Screening - COVID 19 Abs.\par \par \par \par \par F/U to Review Test Results.

## 2023-05-01 LAB
25(OH)D3 SERPL-MCNC: 84.9 NG/ML
ALBUMIN SERPL ELPH-MCNC: 4.7 G/DL
ALP BLD-CCNC: 53 U/L
ALT SERPL-CCNC: 15 U/L
ANION GAP SERPL CALC-SCNC: 14 MMOL/L
APPEARANCE: CLEAR
AST SERPL-CCNC: 16 U/L
BASOPHILS # BLD AUTO: 0.06 K/UL
BASOPHILS NFR BLD AUTO: 1.3 %
BILIRUB SERPL-MCNC: 0.4 MG/DL
BILIRUBIN URINE: NEGATIVE
BLOOD URINE: NEGATIVE
BUN SERPL-MCNC: 19 MG/DL
C PEPTIDE SERPL-MCNC: 2.7 NG/ML
CALCIUM SERPL-MCNC: 9.8 MG/DL
CHLORIDE SERPL-SCNC: 103 MMOL/L
CHOLEST SERPL-MCNC: 178 MG/DL
CO2 SERPL-SCNC: 22 MMOL/L
COLOR: YELLOW
COVID-19 NUCLEOCAPSID  GAM ANTIBODY INTERPRETATION: NEGATIVE
COVID-19 SPIKE DOMAIN ANTIBODY INTERPRETATION: POSITIVE
CREAT SERPL-MCNC: 0.84 MG/DL
CREAT SPEC-SCNC: 72 MG/DL
EGFR: 75 ML/MIN/1.73M2
EOSINOPHIL # BLD AUTO: 0.05 K/UL
EOSINOPHIL NFR BLD AUTO: 1.1 %
ESTIMATED AVERAGE GLUCOSE: 111 MG/DL
FERRITIN SERPL-MCNC: 110 NG/ML
FOLATE SERPL-MCNC: >20 NG/ML
GLUCOSE QUALITATIVE U: NEGATIVE MG/DL
GLUCOSE SERPL-MCNC: 96 MG/DL
HBA1C MFR BLD HPLC: 5.5 %
HCT VFR BLD CALC: 43.9 %
HDLC SERPL-MCNC: 82 MG/DL
HGB BLD-MCNC: 13.9 G/DL
IMM GRANULOCYTES NFR BLD AUTO: 0.2 %
IRON SATN MFR SERPL: 23 %
IRON SERPL-MCNC: 80 UG/DL
KETONES URINE: NEGATIVE MG/DL
LDLC SERPL CALC-MCNC: 80 MG/DL
LEUKOCYTE ESTERASE URINE: ABNORMAL
LYMPHOCYTES # BLD AUTO: 1.43 K/UL
LYMPHOCYTES NFR BLD AUTO: 30.9 %
MAN DIFF?: NORMAL
MCHC RBC-ENTMCNC: 29 PG
MCHC RBC-ENTMCNC: 31.7 GM/DL
MCV RBC AUTO: 91.6 FL
MICROALBUMIN 24H UR DL<=1MG/L-MCNC: <1.2 MG/DL
MICROALBUMIN/CREAT 24H UR-RTO: NORMAL MG/G
MONOCYTES # BLD AUTO: 0.38 K/UL
MONOCYTES NFR BLD AUTO: 8.2 %
NEUTROPHILS # BLD AUTO: 2.7 K/UL
NEUTROPHILS NFR BLD AUTO: 58.3 %
NITRITE URINE: NEGATIVE
NONHDLC SERPL-MCNC: 96 MG/DL
PH URINE: 5.5
PLATELET # BLD AUTO: 282 K/UL
POTASSIUM SERPL-SCNC: 4.2 MMOL/L
PROT SERPL-MCNC: 7.1 G/DL
PROTEIN URINE: NEGATIVE MG/DL
RBC # BLD: 4.79 M/UL
RBC # FLD: 13.8 %
SARS-COV-2 AB SERPL IA-ACNC: >250 U/ML
SARS-COV-2 AB SERPL QL IA: 0.09 INDEX
SODIUM SERPL-SCNC: 140 MMOL/L
SPECIFIC GRAVITY URINE: 1.01
TIBC SERPL-MCNC: 342 UG/DL
TRIGL SERPL-MCNC: 80 MG/DL
TSH SERPL-ACNC: 2.1 UIU/ML
UIBC SERPL-MCNC: 262 UG/DL
UROBILINOGEN URINE: 0.2 MG/DL
VIT B12 SERPL-MCNC: 951 PG/ML
WBC # FLD AUTO: 4.63 K/UL

## 2023-05-11 ENCOUNTER — APPOINTMENT (OUTPATIENT)
Dept: FAMILY MEDICINE | Facility: CLINIC | Age: 68
End: 2023-05-11
Payer: MEDICARE

## 2023-05-11 VITALS — HEIGHT: 60 IN

## 2023-05-11 PROCEDURE — 99214 OFFICE O/P EST MOD 30 MIN: CPT

## 2023-05-12 ENCOUNTER — APPOINTMENT (OUTPATIENT)
Dept: OBGYN | Facility: CLINIC | Age: 68
End: 2023-05-12
Payer: MEDICARE

## 2023-05-12 VITALS
SYSTOLIC BLOOD PRESSURE: 110 MMHG | WEIGHT: 146 LBS | BODY MASS INDEX: 28.66 KG/M2 | DIASTOLIC BLOOD PRESSURE: 70 MMHG | HEIGHT: 60 IN

## 2023-05-12 DIAGNOSIS — Z01.419 ENCOUNTER FOR GYNECOLOGICAL EXAMINATION (GENERAL) (ROUTINE) W/OUT ABNORMAL FINDINGS: ICD-10-CM

## 2023-05-12 PROCEDURE — 99397 PER PM REEVAL EST PAT 65+ YR: CPT

## 2023-05-12 RX ORDER — NEOMYCIN SULFATE, POLYMYXIN B SULFATE AND HYDROCORTISONE 3.5; 10000; 1 MG/ML; [IU]/ML; MG/ML
3.5-10000-1 SOLUTION AURICULAR (OTIC) 4 TIMES DAILY
Qty: 1 | Refills: 3 | Status: DISCONTINUED | COMMUNITY
Start: 2022-02-07 | End: 2023-05-12

## 2023-05-12 NOTE — HISTORY OF PRESENT ILLNESS
[N] : Patient is not sexually active [Menarche Age: ____] : age at menarche was [unfilled] [Menopause Age: ____] : age at menopause was [unfilled] [PGHxTotal] : 3 [[facility]] : 2 [PGHxPremature] : 0 [PGHxAbortions] : 1 [[facility]] : 2 [PGHxABInduced] : 1 [PGHxABSpont] : 0 [PGHxEctopic] : 0 [PGHxMultBirths] : 0

## 2023-05-12 NOTE — PHYSICAL EXAM
[Chaperone Present] : A chaperone was present in the examining room during all aspects of the physical examination [Appropriately responsive] : appropriately responsive [Alert] : alert [No Acute Distress] : no acute distress [Soft] : soft [Non-tender] : non-tender [Non-distended] : non-distended [No HSM] : No HSM [No Lesions] : no lesions [No Mass] : no mass [Oriented x3] : oriented x3 [Examination Of The Breasts] : a normal appearance [No Masses] : no breast masses were palpable [Vulvar Atrophy] : vulvar atrophy [Labia Majora] : normal [Labia Minora] : normal [Normal] : normal [Atrophy] : atrophy [Cystocele] : a cystocele [Rectocele] : a rectocele [Absent] : absent [No Tenderness] : no tenderness [Uterine Adnexae] : absent [Nl Sphincter Tone] : normal sphincter tone

## 2023-08-24 ENCOUNTER — APPOINTMENT (OUTPATIENT)
Dept: FAMILY MEDICINE | Facility: CLINIC | Age: 68
End: 2023-08-24
Payer: MEDICARE

## 2023-08-24 ENCOUNTER — NON-APPOINTMENT (OUTPATIENT)
Age: 68
End: 2023-08-24

## 2023-08-24 VITALS
SYSTOLIC BLOOD PRESSURE: 128 MMHG | TEMPERATURE: 98.2 F | OXYGEN SATURATION: 98 % | RESPIRATION RATE: 15 BRPM | WEIGHT: 142 LBS | HEIGHT: 60 IN | DIASTOLIC BLOOD PRESSURE: 70 MMHG | HEART RATE: 68 BPM | BODY MASS INDEX: 27.88 KG/M2

## 2023-08-24 PROCEDURE — 99213 OFFICE O/P EST LOW 20 MIN: CPT

## 2023-08-24 NOTE — PHYSICAL EXAM
[Well Nourished] : well nourished [Well Developed] : well developed [Well-Appearing] : well-appearing [Normal Sclera/Conjunctiva] : normal sclera/conjunctiva [PERRL] : pupils equal round and reactive to light [EOMI] : extraocular movements intact [Normal Outer Ear/Nose] : the outer ears and nose were normal in appearance [Normal Oropharynx] : the oropharynx was normal [No JVD] : no jugular venous distention [No Lymphadenopathy] : no lymphadenopathy [Supple] : supple [Thyroid Normal, No Nodules] : the thyroid was normal and there were no nodules present [No Respiratory Distress] : no respiratory distress  [No Accessory Muscle Use] : no accessory muscle use [Clear to Auscultation] : lungs were clear to auscultation bilaterally [Normal Rate] : normal rate  [Regular Rhythm] : with a regular rhythm [Normal S1, S2] : normal S1 and S2 [No Murmur] : no murmur heard [No Carotid Bruits] : no carotid bruits [No Abdominal Bruit] : a ~M bruit was not heard ~T in the abdomen [No Varicosities] : no varicosities [Pedal Pulses Present] : the pedal pulses are present [No Edema] : there was no peripheral edema [No Palpable Aorta] : no palpable aorta [No Extremity Clubbing/Cyanosis] : no extremity clubbing/cyanosis [Soft] : abdomen soft [Non Tender] : non-tender [Non-distended] : non-distended [No Masses] : no abdominal mass palpated [No HSM] : no HSM [Normal Bowel Sounds] : normal bowel sounds [No CVA Tenderness] : no CVA  tenderness [No Spinal Tenderness] : no spinal tenderness [No Joint Swelling] : no joint swelling [Grossly Normal Strength/Tone] : grossly normal strength/tone [No Rash] : no rash [Coordination Grossly Intact] : coordination grossly intact [No Focal Deficits] : no focal deficits [Normal Gait] : normal gait [Deep Tendon Reflexes (DTR)] : deep tendon reflexes were 2+ and symmetric [___/1] : [unfilled]/1   [___/3] : [unfilled]/3 [___/5] : [unfilled]/5 [___/4] : [unfilled]/4 [___/2] : [unfilled]/2 [___/8] : [unfilled]/8 [Normal] : Normal [Normal Affect] : the affect was normal [Normal Insight/Judgement] : insight and judgment were intact [Comprehensive Foot Exam Normal] : Right and left foot were examined and both feet are normal. No ulcers in either foot. Toes are normal and with full ROM.  Normal tactile sensation with monofilament testing throughout both feet [de-identified] : Overweight [TextBox_2] : yes [TextBox_4] : HS [SlumsTotal] : 30

## 2023-08-24 NOTE — HISTORY OF PRESENT ILLNESS
[FreeTextEntry1] : C/O Intermittent Abdominal Pain Upper Left Quad Pain over the past 6 wks [de-identified] : C/O Intermittent Abdominal Pain Upper Left Quad Pain over the past 6 wks

## 2023-08-24 NOTE — HEALTH RISK ASSESSMENT
[Yes] : Yes [Monthly or less (1 pt)] : Monthly or less (1 point) [1 or 2 (0 pts)] : 1 or 2 (0 points) [Never (0 pts)] : Never (0 points) [No] : In the past 12 months have you used drugs other than those required for medical reasons? No [No falls in past year] : Patient reported no falls in the past year [0] : 2) Feeling down, depressed, or hopeless: Not at all (0) [PHQ-2 Negative - No further assessment needed] : PHQ-2 Negative - No further assessment needed [Patient/Caregiver not ready to engage] : , patient/caregiver not ready to engage [I will adhere to the patient's wishes.] : I will adhere to the patient's wishes. [Time Spent: ___ minutes] : Time Spent: [unfilled] minutes [Never] : Never [Audit-CScore] : 0 [de-identified] : Average [de-identified] : Average [[facility]] : 8 [[id number]] : 0 [AdvancecareDate] : 03/22

## 2023-09-27 ENCOUNTER — APPOINTMENT (OUTPATIENT)
Dept: GASTROENTEROLOGY | Facility: CLINIC | Age: 68
End: 2023-09-27
Payer: MEDICARE

## 2023-09-27 ENCOUNTER — NON-APPOINTMENT (OUTPATIENT)
Age: 68
End: 2023-09-27

## 2023-09-27 VITALS
BODY MASS INDEX: 28.27 KG/M2 | SYSTOLIC BLOOD PRESSURE: 132 MMHG | DIASTOLIC BLOOD PRESSURE: 102 MMHG | HEIGHT: 60 IN | HEART RATE: 81 BPM | WEIGHT: 144 LBS

## 2023-09-27 PROCEDURE — 99203 OFFICE O/P NEW LOW 30 MIN: CPT

## 2023-09-27 RX ORDER — MELOXICAM 15 MG/1
15 TABLET ORAL
Qty: 21 | Refills: 0 | Status: DISCONTINUED | COMMUNITY
Start: 2023-01-26 | End: 2023-09-27

## 2023-10-02 ENCOUNTER — APPOINTMENT (OUTPATIENT)
Dept: SURGERY | Facility: CLINIC | Age: 68
End: 2023-10-02
Payer: MEDICARE

## 2023-10-02 VITALS
DIASTOLIC BLOOD PRESSURE: 82 MMHG | HEART RATE: 80 BPM | TEMPERATURE: 98.1 F | SYSTOLIC BLOOD PRESSURE: 142 MMHG | RESPIRATION RATE: 16 BRPM | OXYGEN SATURATION: 96 % | HEIGHT: 60.5 IN | WEIGHT: 142 LBS | BODY MASS INDEX: 27.16 KG/M2

## 2023-10-02 PROCEDURE — 99203 OFFICE O/P NEW LOW 30 MIN: CPT

## 2023-10-10 ENCOUNTER — APPOINTMENT (OUTPATIENT)
Dept: FAMILY MEDICINE | Facility: CLINIC | Age: 68
End: 2023-10-10
Payer: MEDICARE

## 2023-10-10 ENCOUNTER — LABORATORY RESULT (OUTPATIENT)
Age: 68
End: 2023-10-10

## 2023-10-10 VITALS
SYSTOLIC BLOOD PRESSURE: 130 MMHG | BODY MASS INDEX: 28.47 KG/M2 | RESPIRATION RATE: 16 BRPM | WEIGHT: 145 LBS | DIASTOLIC BLOOD PRESSURE: 80 MMHG | OXYGEN SATURATION: 98 % | TEMPERATURE: 97.8 F | HEART RATE: 73 BPM | HEIGHT: 60 IN

## 2023-10-10 PROCEDURE — 36415 COLL VENOUS BLD VENIPUNCTURE: CPT

## 2023-10-10 PROCEDURE — 99214 OFFICE O/P EST MOD 30 MIN: CPT | Mod: 25

## 2023-10-10 PROCEDURE — 90662 IIV NO PRSV INCREASED AG IM: CPT

## 2023-10-10 PROCEDURE — G0008: CPT

## 2023-10-12 LAB
25(OH)D3 SERPL-MCNC: 95.5 NG/ML
ALBUMIN SERPL ELPH-MCNC: 4.7 G/DL
ALP BLD-CCNC: 54 U/L
ALT SERPL-CCNC: 14 U/L
ANION GAP SERPL CALC-SCNC: 13 MMOL/L
APPEARANCE: CLEAR
AST SERPL-CCNC: 15 U/L
BASOPHILS # BLD AUTO: 0.06 K/UL
BASOPHILS NFR BLD AUTO: 1.1 %
BILIRUB SERPL-MCNC: 0.5 MG/DL
BILIRUBIN URINE: NEGATIVE
BLOOD URINE: NEGATIVE
BUN SERPL-MCNC: 15 MG/DL
C PEPTIDE SERPL-MCNC: 3 NG/ML
CALCIUM SERPL-MCNC: 9.6 MG/DL
CHLORIDE SERPL-SCNC: 104 MMOL/L
CHOLEST SERPL-MCNC: 193 MG/DL
CO2 SERPL-SCNC: 22 MMOL/L
COLOR: YELLOW
CREAT SERPL-MCNC: 0.81 MG/DL
CREAT SPEC-SCNC: 69 MG/DL
EGFR: 79 ML/MIN/1.73M2
EOSINOPHIL # BLD AUTO: 0.05 K/UL
EOSINOPHIL NFR BLD AUTO: 0.9 %
ESTIMATED AVERAGE GLUCOSE: 114 MG/DL
FERRITIN SERPL-MCNC: 115 NG/ML
FOLATE SERPL-MCNC: >20 NG/ML
GLUCOSE QUALITATIVE U: NEGATIVE MG/DL
GLUCOSE SERPL-MCNC: 106 MG/DL
HBA1C MFR BLD HPLC: 5.6 %
HCT VFR BLD CALC: 43.4 %
HDLC SERPL-MCNC: 83 MG/DL
HGB BLD-MCNC: 13.8 G/DL
IMM GRANULOCYTES NFR BLD AUTO: 0.2 %
IRON SATN MFR SERPL: 26 %
IRON SERPL-MCNC: 81 UG/DL
KETONES URINE: NEGATIVE MG/DL
LDLC SERPL CALC-MCNC: 96 MG/DL
LEUKOCYTE ESTERASE URINE: ABNORMAL
LYMPHOCYTES # BLD AUTO: 1.75 K/UL
LYMPHOCYTES NFR BLD AUTO: 33 %
MAN DIFF?: NORMAL
MCHC RBC-ENTMCNC: 29.6 PG
MCHC RBC-ENTMCNC: 31.8 GM/DL
MCV RBC AUTO: 92.9 FL
MICROALBUMIN 24H UR DL<=1MG/L-MCNC: <1.2 MG/DL
MICROALBUMIN/CREAT 24H UR-RTO: NORMAL MG/G
MONOCYTES # BLD AUTO: 0.38 K/UL
MONOCYTES NFR BLD AUTO: 7.2 %
NEUTROPHILS # BLD AUTO: 3.05 K/UL
NEUTROPHILS NFR BLD AUTO: 57.6 %
NITRITE URINE: NEGATIVE
NONHDLC SERPL-MCNC: 110 MG/DL
PH URINE: 6
PLATELET # BLD AUTO: 274 K/UL
POTASSIUM SERPL-SCNC: 4.2 MMOL/L
PROT SERPL-MCNC: 7.2 G/DL
PROTEIN URINE: NEGATIVE MG/DL
RBC # BLD: 4.67 M/UL
RBC # FLD: 14.4 %
SODIUM SERPL-SCNC: 139 MMOL/L
SPECIFIC GRAVITY URINE: 1.01
T4 SERPL-MCNC: 9.9 UG/DL
TIBC SERPL-MCNC: 309 UG/DL
TRIGL SERPL-MCNC: 79 MG/DL
TSH SERPL-ACNC: 2.32 UIU/ML
UIBC SERPL-MCNC: 228 UG/DL
UROBILINOGEN URINE: 0.2 MG/DL
VIT B12 SERPL-MCNC: 1050 PG/ML
WBC # FLD AUTO: 5.3 K/UL

## 2023-10-19 LAB — URINE CULTURE <10: ABNORMAL

## 2023-10-20 ENCOUNTER — RESULT REVIEW (OUTPATIENT)
Age: 68
End: 2023-10-20

## 2023-10-20 ENCOUNTER — APPOINTMENT (OUTPATIENT)
Dept: DERMATOLOGY | Facility: CLINIC | Age: 68
End: 2023-10-20
Payer: MEDICARE

## 2023-10-20 PROCEDURE — 99203 OFFICE O/P NEW LOW 30 MIN: CPT | Mod: 25

## 2023-10-20 PROCEDURE — 11302 SHAVE SKIN LESION 1.1-2.0 CM: CPT

## 2023-10-20 PROCEDURE — 11102 TANGNTL BX SKIN SINGLE LES: CPT | Mod: 59

## 2023-10-26 ENCOUNTER — APPOINTMENT (OUTPATIENT)
Dept: GASTROENTEROLOGY | Facility: CLINIC | Age: 68
End: 2023-10-26
Payer: MEDICARE

## 2023-10-26 VITALS
WEIGHT: 145 LBS | HEART RATE: 86 BPM | HEIGHT: 60 IN | SYSTOLIC BLOOD PRESSURE: 124 MMHG | DIASTOLIC BLOOD PRESSURE: 56 MMHG | OXYGEN SATURATION: 98 % | RESPIRATION RATE: 16 BRPM | BODY MASS INDEX: 28.47 KG/M2

## 2023-10-26 PROCEDURE — 99214 OFFICE O/P EST MOD 30 MIN: CPT

## 2023-10-26 RX ORDER — CHLORHEXIDINE GLUCONATE 4 %
400 LIQUID (ML) TOPICAL
Refills: 0 | Status: ACTIVE | COMMUNITY

## 2023-10-26 RX ORDER — BORON CITRATE 3 MG
3 TABLET ORAL
Refills: 0 | Status: ACTIVE | COMMUNITY

## 2023-10-26 RX ORDER — DM/P-EPHED/ACETAMINOPH/DOXYLAM
LIQUID (ML) ORAL
Refills: 0 | Status: ACTIVE | COMMUNITY

## 2023-10-26 RX ORDER — EVENING PRIMROSE OIL 500 MG
500 CAPSULE ORAL
Refills: 0 | Status: ACTIVE | COMMUNITY

## 2023-10-26 RX ORDER — MULTIVIT-MIN/IRON/FOLIC ACID/K 18-600-40
500 CAPSULE ORAL
Refills: 0 | Status: ACTIVE | COMMUNITY

## 2023-10-27 ENCOUNTER — RX RENEWAL (OUTPATIENT)
Age: 68
End: 2023-10-27

## 2023-10-30 ENCOUNTER — APPOINTMENT (OUTPATIENT)
Dept: FAMILY MEDICINE | Facility: CLINIC | Age: 68
End: 2023-10-30
Payer: MEDICARE

## 2023-10-30 VITALS
TEMPERATURE: 98.4 F | WEIGHT: 144 LBS | SYSTOLIC BLOOD PRESSURE: 140 MMHG | DIASTOLIC BLOOD PRESSURE: 66 MMHG | BODY MASS INDEX: 28.27 KG/M2 | OXYGEN SATURATION: 98 % | HEIGHT: 60 IN | HEART RATE: 76 BPM | RESPIRATION RATE: 16 BRPM

## 2023-10-30 PROCEDURE — 99214 OFFICE O/P EST MOD 30 MIN: CPT

## 2023-11-14 ENCOUNTER — OFFICE (OUTPATIENT)
Dept: URBAN - METROPOLITAN AREA CLINIC 104 | Facility: CLINIC | Age: 68
Setting detail: OPHTHALMOLOGY
End: 2023-11-14
Payer: MEDICARE

## 2023-11-14 DIAGNOSIS — H26.493: ICD-10-CM

## 2023-11-14 DIAGNOSIS — H01.005: ICD-10-CM

## 2023-11-14 DIAGNOSIS — H35.033: ICD-10-CM

## 2023-11-14 DIAGNOSIS — Z96.1: ICD-10-CM

## 2023-11-14 DIAGNOSIS — H43.813: ICD-10-CM

## 2023-11-14 DIAGNOSIS — H01.001: ICD-10-CM

## 2023-11-14 DIAGNOSIS — H01.004: ICD-10-CM

## 2023-11-14 DIAGNOSIS — H01.002: ICD-10-CM

## 2023-11-14 DIAGNOSIS — H11.823: ICD-10-CM

## 2023-11-14 PROCEDURE — 92014 COMPRE OPH EXAM EST PT 1/>: CPT | Performed by: SPECIALIST

## 2023-11-14 ASSESSMENT — REFRACTION_AUTOREFRACTION
OD_SPHERE: -2.00
OS_AXIS: 98
OS_CYLINDER: -1.00
OD_AXIS: 70
OS_SPHERE: -1.25
OD_CYLINDER: -0.50

## 2023-11-14 ASSESSMENT — REFRACTION_CURRENTRX
OD_AXIS: 38
OS_CYLINDER: -0.25
OS_VPRISM_DIRECTION: SV
OD_CYLINDER: -0.25
OD_VPRISM_DIRECTION: SV
OD_OVR_VA: 20/
OS_OVR_VA: 20/
OS_SPHERE: -1.75
OS_AXIS: 120
OD_SPHERE: -2.00

## 2023-11-14 ASSESSMENT — CONFRONTATIONAL VISUAL FIELD TEST (CVF)
OD_FINDINGS: FULL
OS_FINDINGS: FULL

## 2023-11-14 ASSESSMENT — SPHEQUIV_DERIVED
OS_SPHEQUIV: -1.75
OD_SPHEQUIV: -2.25

## 2023-11-15 ENCOUNTER — APPOINTMENT (OUTPATIENT)
Dept: DERMATOLOGY | Facility: CLINIC | Age: 68
End: 2023-11-15
Payer: MEDICARE

## 2023-11-15 PROCEDURE — 12052 INTMD RPR FACE/MM 2.6-5.0 CM: CPT

## 2023-11-15 PROCEDURE — 17311 MOHS 1 STAGE H/N/HF/G: CPT

## 2023-11-15 RX ORDER — MUPIROCIN 20 MG/G
2 OINTMENT TOPICAL TWICE DAILY
Qty: 1 | Refills: 6 | Status: ACTIVE | COMMUNITY
Start: 2023-11-15 | End: 1900-01-01

## 2024-02-15 ENCOUNTER — APPOINTMENT (OUTPATIENT)
Dept: FAMILY MEDICINE | Facility: CLINIC | Age: 69
End: 2024-02-15
Payer: MEDICARE

## 2024-02-15 ENCOUNTER — RX RENEWAL (OUTPATIENT)
Age: 69
End: 2024-02-15

## 2024-02-15 VITALS
RESPIRATION RATE: 16 BRPM | DIASTOLIC BLOOD PRESSURE: 68 MMHG | HEIGHT: 60 IN | TEMPERATURE: 98.4 F | HEART RATE: 88 BPM | OXYGEN SATURATION: 98 % | SYSTOLIC BLOOD PRESSURE: 142 MMHG

## 2024-02-15 DIAGNOSIS — Z12.39 ENCOUNTER FOR OTHER SCREENING FOR MALIGNANT NEOPLASM OF BREAST: ICD-10-CM

## 2024-02-15 DIAGNOSIS — Z87.898 PERSONAL HISTORY OF OTHER SPECIFIED CONDITIONS: ICD-10-CM

## 2024-02-15 DIAGNOSIS — Z88.9 ALLERGY STATUS TO UNSPECIFIED DRUGS, MEDICAMENTS AND BIOLOGICAL SUBSTANCES: ICD-10-CM

## 2024-02-15 DIAGNOSIS — Z92.29 PERSONAL HISTORY OF OTHER DRUG THERAPY: ICD-10-CM

## 2024-02-15 DIAGNOSIS — Z20.822 CONTACT WITH AND (SUSPECTED) EXPOSURE TO COVID-19: ICD-10-CM

## 2024-02-15 DIAGNOSIS — H61.23 IMPACTED CERUMEN, BILATERAL: ICD-10-CM

## 2024-02-15 DIAGNOSIS — R92.30 DENSE BREASTS, UNSPECIFIED: ICD-10-CM

## 2024-02-15 DIAGNOSIS — Z11.4 ENCOUNTER FOR SCREENING FOR HUMAN IMMUNODEFICIENCY VIRUS [HIV]: ICD-10-CM

## 2024-02-15 DIAGNOSIS — M24.811 OTHER SPECIFIC JOINT DERANGEMENTS OF RIGHT SHOULDER, NOT ELSEWHERE CLASSIFIED: ICD-10-CM

## 2024-02-15 DIAGNOSIS — M54.50 LOW BACK PAIN, UNSPECIFIED: ICD-10-CM

## 2024-02-15 DIAGNOSIS — Z23 ENCOUNTER FOR IMMUNIZATION: ICD-10-CM

## 2024-02-15 DIAGNOSIS — Z12.11 ENCOUNTER FOR SCREENING FOR MALIGNANT NEOPLASM OF COLON: ICD-10-CM

## 2024-02-15 DIAGNOSIS — H26.9 UNSPECIFIED CATARACT: ICD-10-CM

## 2024-02-15 DIAGNOSIS — J06.9 ACUTE UPPER RESPIRATORY INFECTION, UNSPECIFIED: ICD-10-CM

## 2024-02-15 DIAGNOSIS — R10.9 UNSPECIFIED ABDOMINAL PAIN: ICD-10-CM

## 2024-02-15 DIAGNOSIS — M25.511 PAIN IN RIGHT SHOULDER: ICD-10-CM

## 2024-02-15 PROCEDURE — 99213 OFFICE O/P EST LOW 20 MIN: CPT

## 2024-02-17 LAB
BASOPHILS # BLD AUTO: 0.06 K/UL
BASOPHILS NFR BLD AUTO: 1.4 %
EOSINOPHIL # BLD AUTO: 0.05 K/UL
EOSINOPHIL NFR BLD AUTO: 1.1 %
HCT VFR BLD CALC: 43.6 %
HGB BLD-MCNC: 14.3 G/DL
IMM GRANULOCYTES NFR BLD AUTO: 0.2 %
LYMPHOCYTES # BLD AUTO: 0.96 K/UL
LYMPHOCYTES NFR BLD AUTO: 22 %
MAN DIFF?: NORMAL
MCHC RBC-ENTMCNC: 29.4 PG
MCHC RBC-ENTMCNC: 32.8 GM/DL
MCV RBC AUTO: 89.5 FL
MONOCYTES # BLD AUTO: 0.47 K/UL
MONOCYTES NFR BLD AUTO: 10.8 %
NEUTROPHILS # BLD AUTO: 2.81 K/UL
NEUTROPHILS NFR BLD AUTO: 64.5 %
PLATELET # BLD AUTO: 245 K/UL
RBC # BLD: 4.87 M/UL
RBC # FLD: 13.6 %
WBC # FLD AUTO: 4.36 K/UL

## 2024-02-19 LAB
CORONAVIRUS (229E,HKU1,NL63,OC43): DETECTED
RAPID RVP RESULT: DETECTED
SARS-COV-2 RNA PNL RESP NAA+PROBE: NOT DETECTED

## 2024-02-27 PROBLEM — R92.30 DENSE BREAST: Status: RESOLVED | Noted: 2019-06-25 | Resolved: 2024-02-27

## 2024-02-27 PROBLEM — Z88.9 HISTORY OF ALLERGY: Status: RESOLVED | Noted: 2018-02-01 | Resolved: 2024-02-27

## 2024-02-27 PROBLEM — M25.511 RIGHT SHOULDER PAIN: Status: RESOLVED | Noted: 2023-01-26 | Resolved: 2024-02-27

## 2024-02-27 PROBLEM — Z12.39 BREAST CANCER SCREENING: Status: RESOLVED | Noted: 2020-06-08 | Resolved: 2024-02-27

## 2024-02-27 PROBLEM — H61.23 BILATERAL IMPACTED CERUMEN: Status: RESOLVED | Noted: 2021-08-19 | Resolved: 2024-02-27

## 2024-02-27 PROBLEM — Z23 ENCOUNTER FOR IMMUNIZATION: Status: RESOLVED | Noted: 2023-10-10 | Resolved: 2024-02-27

## 2024-02-27 PROBLEM — Z87.898 HISTORY OF DIZZINESS: Status: RESOLVED | Noted: 2022-03-04 | Resolved: 2024-02-27

## 2024-02-27 PROBLEM — Z92.29 HISTORY OF PNEUMOCOCCAL VACCINATION: Status: RESOLVED | Noted: 2020-09-29 | Resolved: 2024-02-27

## 2024-02-27 PROBLEM — Z92.29 HISTORY OF INFLUENZA VACCINATION: Status: RESOLVED | Noted: 2019-09-26 | Resolved: 2024-02-27

## 2024-02-27 PROBLEM — Z23 NEED FOR VACCINATION WITH 13-POLYVALENT PNEUMOCOCCAL CONJUGATE VACCINE: Status: RESOLVED | Noted: 2019-09-26 | Resolved: 2024-02-27

## 2024-02-27 PROBLEM — Z20.822 ENCOUNTER FOR LABORATORY TESTING FOR COVID-19 VIRUS: Status: RESOLVED | Noted: 2020-08-11 | Resolved: 2024-02-27

## 2024-02-27 PROBLEM — Z12.11 COLON CANCER SCREENING: Status: RESOLVED | Noted: 2017-11-14 | Resolved: 2024-02-27

## 2024-02-27 PROBLEM — R10.9 ABDOMINAL DISCOMFORT: Status: RESOLVED | Noted: 2023-08-24 | Resolved: 2024-02-27

## 2024-02-27 PROBLEM — Z92.29 HISTORY OF INFLUENZA VACCINATION: Status: RESOLVED | Noted: 2022-10-14 | Resolved: 2024-02-27

## 2024-02-27 PROBLEM — M54.50 BACK PAIN, LUMBOSACRAL: Status: ACTIVE | Noted: 2023-01-19

## 2024-02-27 PROBLEM — H26.9 BILATERAL CATARACTS: Status: RESOLVED | Noted: 2019-05-15 | Resolved: 2024-02-27

## 2024-02-27 PROBLEM — M24.811 INTERNAL DERANGEMENT OF RIGHT SHOULDER: Status: RESOLVED | Noted: 2023-01-19 | Resolved: 2024-02-27

## 2024-02-27 PROBLEM — Z11.4 ENCOUNTER FOR SCREENING FOR HIV: Status: RESOLVED | Noted: 2019-03-19 | Resolved: 2024-02-27

## 2024-02-27 NOTE — HISTORY OF PRESENT ILLNESS
[FreeTextEntry8] : 69 y/o female with pmhx of vitamin D deficiency, fatigue, HLD presenting c/o productive cough. Pt states she was seen at urgent care 1.5 weeks ago for same symptoms and was given a 10 day course of Augmentin without improvement in symptoms. She reports associated postnasal drip, fatigue, nasal congestion. Denies any fever, chills, headache, dizziness, sore throat, chest pain, SOB. Pt reports immediate family at home is sick with similar symptoms.

## 2024-02-27 NOTE — ASSESSMENT
[FreeTextEntry1] : 69 y/o female with pmhx of vitamin D deficiency, fatigue, HLD presenting c/o productive cough. Pt states she was seen at urgent care 1.5 weeks ago for same symptoms and was given a 10 day course of Augmentin without improvement in symptoms. She reports associated postnasal drip, fatigue, nasal congestion. Denies any fever, chills, headache, dizziness, sore throat, chest pain, SOB. Pt reports immediate family at home is sick with similar symptoms.   Ordered RVP, CBC, CXR  Pt states she does not want to get CXR at this time.  Doxycycline 100mg BID PO x14 days  RTC in 2 weeks

## 2024-02-27 NOTE — PHYSICAL EXAM
[Well Nourished] : well nourished [Well Developed] : well developed [Well-Appearing] : well-appearing [Normal Sclera/Conjunctiva] : normal sclera/conjunctiva [PERRL] : pupils equal round and reactive to light [EOMI] : extraocular movements intact [Normal Outer Ear/Nose] : the outer ears and nose were normal in appearance [Normal Oropharynx] : the oropharynx was normal [No JVD] : no jugular venous distention [No Lymphadenopathy] : no lymphadenopathy [Supple] : supple [Thyroid Normal, No Nodules] : the thyroid was normal and there were no nodules present [No Respiratory Distress] : no respiratory distress  [No Accessory Muscle Use] : no accessory muscle use [Normal Rate] : normal rate  [Regular Rhythm] : with a regular rhythm [Normal S1, S2] : normal S1 and S2 [No Murmur] : no murmur heard [No Carotid Bruits] : no carotid bruits [No Abdominal Bruit] : a ~M bruit was not heard ~T in the abdomen [No Varicosities] : no varicosities [Pedal Pulses Present] : the pedal pulses are present [No Edema] : there was no peripheral edema [No Palpable Aorta] : no palpable aorta [No Extremity Clubbing/Cyanosis] : no extremity clubbing/cyanosis [Soft] : abdomen soft [Non Tender] : non-tender [Non-distended] : non-distended [No Masses] : no abdominal mass palpated [No HSM] : no HSM [Normal Bowel Sounds] : normal bowel sounds [No CVA Tenderness] : no CVA  tenderness [No Spinal Tenderness] : no spinal tenderness [No Joint Swelling] : no joint swelling [Grossly Normal Strength/Tone] : grossly normal strength/tone [No Rash] : no rash [Coordination Grossly Intact] : coordination grossly intact [No Focal Deficits] : no focal deficits [Normal Gait] : normal gait [Deep Tendon Reflexes (DTR)] : deep tendon reflexes were 2+ and symmetric [___/1] : [unfilled]/1   [___/3] : [unfilled]/3 [___/5] : [unfilled]/5 [___/4] : [unfilled]/4 [___/2] : [unfilled]/2 [___/8] : [unfilled]/8 [Normal] : Normal [Normal Affect] : the affect was normal [Normal Insight/Judgement] : insight and judgment were intact [Comprehensive Foot Exam Normal] : Right and left foot were examined and both feet are normal. No ulcers in either foot. Toes are normal and with full ROM.  Normal tactile sensation with monofilament testing throughout both feet [de-identified] : Overweight [TextBox_4] : HS [SlumsTotal] : 30 [TextBox_2] : yes

## 2024-02-27 NOTE — REVIEW OF SYSTEMS
[Patient Intake Form Reviewed] : Patient intake form was reviewed [Fatigue] : fatigue [Negative] : Heme/Lymph [Nasal Discharge] : nasal discharge [Postnasal Drip] : postnasal drip [Cough] : cough [Earache] : no earache [Hearing Loss] : no hearing loss [Nosebleed] : no nosebleeds [Sore Throat] : no sore throat [FreeTextEntry2] : Overweight [FreeTextEntry6] : productive cough [FreeTextEntry1] :  Low Vit D

## 2024-02-27 NOTE — HEALTH RISK ASSESSMENT
[Yes] : Yes [Monthly or less (1 pt)] : Monthly or less (1 point) [1 or 2 (0 pts)] : 1 or 2 (0 points) [Never (0 pts)] : Never (0 points) [No] : In the past 12 months have you used drugs other than those required for medical reasons? No [No falls in past year] : Patient reported no falls in the past year [0] : 2) Feeling down, depressed, or hopeless: Not at all (0) [PHQ-2 Negative - No further assessment needed] : PHQ-2 Negative - No further assessment needed [Never] : Never [Patient/Caregiver not ready to engage] : , patient/caregiver not ready to engage [I will adhere to the patient's wishes.] : I will adhere to the patient's wishes. [Time Spent: ___ minutes] : Time Spent: [unfilled] minutes [Audit-CScore] : 0 [de-identified] : Average [de-identified] : Average [[facility]] : 8 [[id number]] : 0 [AdvancecareDate] : 10/23

## 2024-04-02 ENCOUNTER — APPOINTMENT (OUTPATIENT)
Dept: DERMATOLOGY | Facility: CLINIC | Age: 69
End: 2024-04-02
Payer: MEDICARE

## 2024-04-02 PROCEDURE — 99213 OFFICE O/P EST LOW 20 MIN: CPT | Mod: 25

## 2024-04-02 PROCEDURE — 10040 EXTRACTION: CPT

## 2024-04-15 ENCOUNTER — LABORATORY RESULT (OUTPATIENT)
Age: 69
End: 2024-04-15

## 2024-04-15 ENCOUNTER — APPOINTMENT (OUTPATIENT)
Dept: FAMILY MEDICINE | Facility: CLINIC | Age: 69
End: 2024-04-15
Payer: MEDICARE

## 2024-04-15 VITALS
TEMPERATURE: 98.7 F | OXYGEN SATURATION: 98 % | DIASTOLIC BLOOD PRESSURE: 74 MMHG | WEIGHT: 139.04 LBS | HEART RATE: 73 BPM | SYSTOLIC BLOOD PRESSURE: 144 MMHG | HEIGHT: 60 IN | BODY MASS INDEX: 27.3 KG/M2 | RESPIRATION RATE: 14 BRPM

## 2024-04-15 PROCEDURE — 99214 OFFICE O/P EST MOD 30 MIN: CPT

## 2024-04-15 PROCEDURE — 36415 COLL VENOUS BLD VENIPUNCTURE: CPT

## 2024-04-15 PROCEDURE — G2211 COMPLEX E/M VISIT ADD ON: CPT

## 2024-04-15 RX ORDER — ATORVASTATIN CALCIUM 10 MG/1
10 TABLET, FILM COATED ORAL
Qty: 90 | Refills: 1 | Status: ACTIVE | COMMUNITY
Start: 2019-04-15 | End: 1900-01-01

## 2024-04-15 NOTE — REVIEW OF SYSTEMS
[Patient Intake Form Reviewed] : Patient intake form was reviewed [Fatigue] : fatigue [Negative] : Heme/Lymph [FreeTextEntry2] : Overweight [FreeTextEntry1] :  Low Vit D

## 2024-04-15 NOTE — ASSESSMENT
[FreeTextEntry1] : Overweight/Fatigue/Decreased GFR - Labs for Fatigue and Dysmetabolism. HLD - Lipid Panel. Low Vit. D - Vit. D Level. COVID 19 Screening - (+) COVID 19 Abs. LUQ pain - GI recommend cholecystectomy s/p abd US; advised to wait since pt is hesitant & asymptomatic but continue to monitor and f/u with GI  Influenza vaccine given LUE    F/U to Review Test Results.

## 2024-04-15 NOTE — HISTORY OF PRESENT ILLNESS
[FreeTextEntry1] : Follow apt. up for Lab Testing [de-identified] : Follow apt. up for Lab Testing

## 2024-04-15 NOTE — COUNSELING
[Fall prevention counseling provided] : Fall prevention counseling provided [Adequate lighting] : Adequate lighting [No throw rugs] : No throw rugs [Use proper foot wear] : Use proper foot wear [Behavioral health counseling provided] : Behavioral health counseling provided [Sleep ___ hours/day] : Sleep [unfilled] hours/day [Engage in a relaxing activity] : Engage in a relaxing activity [Plan in advance] : Plan in advance [AUDIT-C Screening administered and reviewed] : AUDIT-C Screening administered and reviewed [Potential consequences of obesity discussed] : Potential consequences of obesity discussed [Benefits of weight loss discussed] : Benefits of weight loss discussed [Encouraged to increase physical activity] : Encouraged to increase physical activity [Weigh Self Weekly] : weigh self weekly [Decrease Portions] : decrease portions [Good understanding] : Patient has a good understanding of lifestyle changes and steps needed to achieve self management goal [None] : None [FreeTextEntry2] : Never Smoked

## 2024-04-15 NOTE — HEALTH RISK ASSESSMENT
[Yes] : Yes [Monthly or less (1 pt)] : Monthly or less (1 point) [1 or 2 (0 pts)] : 1 or 2 (0 points) [Never (0 pts)] : Never (0 points) [No] : In the past 12 months have you used drugs other than those required for medical reasons? No [No falls in past year] : Patient reported no falls in the past year [0] : 2) Feeling down, depressed, or hopeless: Not at all (0) [PHQ-2 Negative - No further assessment needed] : PHQ-2 Negative - No further assessment needed [Patient/Caregiver not ready to engage] : , patient/caregiver not ready to engage [I will adhere to the patient's wishes.] : I will adhere to the patient's wishes. [Time Spent: ___ minutes] : Time Spent: [unfilled] minutes [Never] : Never [Audit-CScore] : 0 [de-identified] : Average [de-identified] : Average [facility]] : 8 [[id number]] : 0 [AdvancecareDate] : 10/23

## 2024-04-15 NOTE — PHYSICAL EXAM
[Well Nourished] : well nourished [Well Developed] : well developed [Well-Appearing] : well-appearing [Normal Sclera/Conjunctiva] : normal sclera/conjunctiva [PERRL] : pupils equal round and reactive to light [EOMI] : extraocular movements intact [Normal Outer Ear/Nose] : the outer ears and nose were normal in appearance [Normal Oropharynx] : the oropharynx was normal [No JVD] : no jugular venous distention [No Lymphadenopathy] : no lymphadenopathy [Supple] : supple [Thyroid Normal, No Nodules] : the thyroid was normal and there were no nodules present [No Respiratory Distress] : no respiratory distress  [No Accessory Muscle Use] : no accessory muscle use [Clear to Auscultation] : lungs were clear to auscultation bilaterally [Normal Rate] : normal rate  [Regular Rhythm] : with a regular rhythm [Normal S1, S2] : normal S1 and S2 [No Murmur] : no murmur heard [No Carotid Bruits] : no carotid bruits [No Abdominal Bruit] : a ~M bruit was not heard ~T in the abdomen [No Varicosities] : no varicosities [Pedal Pulses Present] : the pedal pulses are present [No Edema] : there was no peripheral edema [No Palpable Aorta] : no palpable aorta [No Extremity Clubbing/Cyanosis] : no extremity clubbing/cyanosis [Soft] : abdomen soft [Non Tender] : non-tender [Non-distended] : non-distended [No Masses] : no abdominal mass palpated [No HSM] : no HSM [Normal Bowel Sounds] : normal bowel sounds [No CVA Tenderness] : no CVA  tenderness [No Spinal Tenderness] : no spinal tenderness [No Joint Swelling] : no joint swelling [Grossly Normal Strength/Tone] : grossly normal strength/tone [No Rash] : no rash [Coordination Grossly Intact] : coordination grossly intact [No Focal Deficits] : no focal deficits [Normal Gait] : normal gait [Deep Tendon Reflexes (DTR)] : deep tendon reflexes were 2+ and symmetric [___/1] : [unfilled]/1   [___/3] : [unfilled]/3 [___/5] : [unfilled]/5 [___/4] : [unfilled]/4 [___/2] : [unfilled]/2 [___/8] : [unfilled]/8 [Normal] : Normal [Normal Affect] : the affect was normal [Normal Insight/Judgement] : insight and judgment were intact [Comprehensive Foot Exam Normal] : Right and left foot were examined and both feet are normal. No ulcers in either foot. Toes are normal and with full ROM.  Normal tactile sensation with monofilament testing throughout both feet [de-identified] : Overweight [TextBox_2] : yes [TextBox_4] : HS [SlumsTotal] : 30

## 2024-04-18 LAB
25(OH)D3 SERPL-MCNC: 92.1 NG/ML
ALBUMIN SERPL ELPH-MCNC: 4.6 G/DL
ALP BLD-CCNC: 54 U/L
ALT SERPL-CCNC: 16 U/L
ANION GAP SERPL CALC-SCNC: 15 MMOL/L
APPEARANCE: CLEAR
AST SERPL-CCNC: 22 U/L
BASOPHILS # BLD AUTO: 0.06 K/UL
BASOPHILS NFR BLD AUTO: 1.5 %
BILIRUB SERPL-MCNC: 0.5 MG/DL
BILIRUBIN URINE: NEGATIVE
BLOOD URINE: NEGATIVE
BUN SERPL-MCNC: 14 MG/DL
C PEPTIDE SERPL-MCNC: 2.1 NG/ML
CALCIUM SERPL-MCNC: 9.5 MG/DL
CHLORIDE SERPL-SCNC: 104 MMOL/L
CHOLEST SERPL-MCNC: 195 MG/DL
CO2 SERPL-SCNC: 21 MMOL/L
COLOR: YELLOW
CREAT SERPL-MCNC: 0.84 MG/DL
CREAT SPEC-SCNC: 143 MG/DL
EGFR: 75 ML/MIN/1.73M2
EOSINOPHIL # BLD AUTO: 0.11 K/UL
EOSINOPHIL NFR BLD AUTO: 2.7 %
ESTIMATED AVERAGE GLUCOSE: 108 MG/DL
FERRITIN SERPL-MCNC: 140 NG/ML
FOLATE SERPL-MCNC: >20 NG/ML
GLUCOSE QUALITATIVE U: NEGATIVE MG/DL
GLUCOSE SERPL-MCNC: 90 MG/DL
HBA1C MFR BLD HPLC: 5.4 %
HCT VFR BLD CALC: 42.2 %
HDLC SERPL-MCNC: 85 MG/DL
HGB BLD-MCNC: 13.3 G/DL
IMM GRANULOCYTES NFR BLD AUTO: 0.2 %
IRON SATN MFR SERPL: 23 %
IRON SERPL-MCNC: 73 UG/DL
KETONES URINE: NEGATIVE MG/DL
LDLC SERPL CALC-MCNC: 94 MG/DL
LEUKOCYTE ESTERASE URINE: ABNORMAL
LYMPHOCYTES # BLD AUTO: 1.39 K/UL
LYMPHOCYTES NFR BLD AUTO: 33.8 %
MAN DIFF?: NORMAL
MCHC RBC-ENTMCNC: 29.1 PG
MCHC RBC-ENTMCNC: 31.5 GM/DL
MCV RBC AUTO: 92.3 FL
MICROALBUMIN 24H UR DL<=1MG/L-MCNC: <1.2 MG/DL
MICROALBUMIN/CREAT 24H UR-RTO: NORMAL MG/G
MONOCYTES # BLD AUTO: 0.36 K/UL
MONOCYTES NFR BLD AUTO: 8.8 %
NEUTROPHILS # BLD AUTO: 2.18 K/UL
NEUTROPHILS NFR BLD AUTO: 53 %
NITRITE URINE: NEGATIVE
NONHDLC SERPL-MCNC: 110 MG/DL
PH URINE: 5.5
PLATELET # BLD AUTO: 254 K/UL
POTASSIUM SERPL-SCNC: 4.4 MMOL/L
PROT SERPL-MCNC: 7 G/DL
PROTEIN URINE: NEGATIVE MG/DL
RBC # BLD: 4.57 M/UL
RBC # FLD: 15 %
SODIUM SERPL-SCNC: 140 MMOL/L
SPECIFIC GRAVITY URINE: 1.02
T4 SERPL-MCNC: 9 UG/DL
TIBC SERPL-MCNC: 309 UG/DL
TRIGL SERPL-MCNC: 91 MG/DL
TSH SERPL-ACNC: 2.63 UIU/ML
UIBC SERPL-MCNC: 237 UG/DL
UROBILINOGEN URINE: 1 MG/DL
VIT B12 SERPL-MCNC: 803 PG/ML
WBC # FLD AUTO: 4.11 K/UL

## 2024-05-07 ENCOUNTER — APPOINTMENT (OUTPATIENT)
Dept: FAMILY MEDICINE | Facility: CLINIC | Age: 69
End: 2024-05-07
Payer: MEDICARE

## 2024-05-07 DIAGNOSIS — Z82.49 FAMILY HISTORY OF ISCHEMIC HEART DISEASE AND OTHER DISEASES OF THE CIRCULATORY SYSTEM: ICD-10-CM

## 2024-05-07 DIAGNOSIS — Z80.1 FAMILY HISTORY OF MALIGNANT NEOPLASM OF TRACHEA, BRONCHUS AND LUNG: ICD-10-CM

## 2024-05-07 DIAGNOSIS — Z80.3 FAMILY HISTORY OF MALIGNANT NEOPLASM OF BREAST: ICD-10-CM

## 2024-05-07 DIAGNOSIS — Z83.3 FAMILY HISTORY OF DIABETES MELLITUS: ICD-10-CM

## 2024-05-07 DIAGNOSIS — K80.20 CALCULUS OF GALLBLADDER W/OUT CHOLECYSTITIS W/OUT OBSTRUCTION: ICD-10-CM

## 2024-05-07 DIAGNOSIS — E66.3 OVERWEIGHT: ICD-10-CM

## 2024-05-07 DIAGNOSIS — R94.4 ABNORMAL RESULTS OF KIDNEY FUNCTION STUDIES: ICD-10-CM

## 2024-05-07 DIAGNOSIS — E55.9 VITAMIN D DEFICIENCY, UNSPECIFIED: ICD-10-CM

## 2024-05-07 DIAGNOSIS — Z78.9 OTHER SPECIFIED HEALTH STATUS: ICD-10-CM

## 2024-05-07 DIAGNOSIS — R53.83 OTHER FATIGUE: ICD-10-CM

## 2024-05-07 PROCEDURE — 99214 OFFICE O/P EST MOD 30 MIN: CPT

## 2024-05-07 PROCEDURE — G2211 COMPLEX E/M VISIT ADD ON: CPT

## 2024-05-07 NOTE — HISTORY OF PRESENT ILLNESS
[FreeTextEntry1] : F/U Apt. for Lab Testing Review [de-identified] : F/U Apt. for Lab Testing Review

## 2024-05-07 NOTE — PHYSICAL EXAM
[Well Nourished] : well nourished [Well Developed] : well developed [Well-Appearing] : well-appearing [Normal Sclera/Conjunctiva] : normal sclera/conjunctiva [PERRL] : pupils equal round and reactive to light [EOMI] : extraocular movements intact [Normal Outer Ear/Nose] : the outer ears and nose were normal in appearance [Normal Oropharynx] : the oropharynx was normal [No JVD] : no jugular venous distention [No Lymphadenopathy] : no lymphadenopathy [Supple] : supple [Thyroid Normal, No Nodules] : the thyroid was normal and there were no nodules present [No Respiratory Distress] : no respiratory distress  [No Accessory Muscle Use] : no accessory muscle use [Clear to Auscultation] : lungs were clear to auscultation bilaterally [Normal Rate] : normal rate  [Regular Rhythm] : with a regular rhythm [Normal S1, S2] : normal S1 and S2 [No Murmur] : no murmur heard [No Carotid Bruits] : no carotid bruits [No Abdominal Bruit] : a ~M bruit was not heard ~T in the abdomen [No Varicosities] : no varicosities [Pedal Pulses Present] : the pedal pulses are present [No Edema] : there was no peripheral edema [No Palpable Aorta] : no palpable aorta [No Extremity Clubbing/Cyanosis] : no extremity clubbing/cyanosis [Soft] : abdomen soft [Non Tender] : non-tender [Non-distended] : non-distended [No Masses] : no abdominal mass palpated [No HSM] : no HSM [Normal Bowel Sounds] : normal bowel sounds [No CVA Tenderness] : no CVA  tenderness [No Spinal Tenderness] : no spinal tenderness [No Joint Swelling] : no joint swelling [Grossly Normal Strength/Tone] : grossly normal strength/tone [No Rash] : no rash [Coordination Grossly Intact] : coordination grossly intact [No Focal Deficits] : no focal deficits [Normal Gait] : normal gait [Deep Tendon Reflexes (DTR)] : deep tendon reflexes were 2+ and symmetric [___/1] : [unfilled]/1   [___/3] : [unfilled]/3 [___/5] : [unfilled]/5 [___/4] : [unfilled]/4 [___/2] : [unfilled]/2 [___/8] : [unfilled]/8 [Normal] : Normal [Normal Affect] : the affect was normal [Normal Insight/Judgement] : insight and judgment were intact [Comprehensive Foot Exam Normal] : Right and left foot were examined and both feet are normal. No ulcers in either foot. Toes are normal and with full ROM.  Normal tactile sensation with monofilament testing throughout both feet [de-identified] : Overweight [TextBox_2] : yes [TextBox_4] : HS [SlumsTotal] : 30

## 2024-05-07 NOTE — HEALTH RISK ASSESSMENT
[Yes] : Yes [Monthly or less (1 pt)] : Monthly or less (1 point) [1 or 2 (0 pts)] : 1 or 2 (0 points) [Never (0 pts)] : Never (0 points) [No] : In the past 12 months have you used drugs other than those required for medical reasons? No [No falls in past year] : Patient reported no falls in the past year [0] : 2) Feeling down, depressed, or hopeless: Not at all (0) [PHQ-2 Negative - No further assessment needed] : PHQ-2 Negative - No further assessment needed [Patient/Caregiver not ready to engage] : , patient/caregiver not ready to engage [I will adhere to the patient's wishes.] : I will adhere to the patient's wishes. [Time Spent: ___ minutes] : Time Spent: [unfilled] minutes [Never] : Never [Audit-CScore] : 0 [de-identified] : Average [de-identified] : Average [facility]] : 8 [[id number]] : 0 [AdvancecareDate] : 10/23

## 2024-05-07 NOTE — ASSESSMENT
[FreeTextEntry1] : Overweight/Fatigue/Decreased GFR - Diet and Exercise. HLD - Lipid Panel Revied with Pt.. Low Vit. D - Vit. D. COVID 19 Screening - (+) COVID 19 Abs. LUQ pain - GI recommend cholecystectomy s/p abd US; advised to wait since pt is hesitant & asymptomatic but continue to monitor and f/u with GI      F/U to Review Test Results.

## 2024-05-15 ENCOUNTER — APPOINTMENT (OUTPATIENT)
Dept: OBGYN | Facility: CLINIC | Age: 69
End: 2024-05-15
Payer: MEDICARE

## 2024-05-15 VITALS
SYSTOLIC BLOOD PRESSURE: 122 MMHG | WEIGHT: 141.5 LBS | BODY MASS INDEX: 27.78 KG/M2 | DIASTOLIC BLOOD PRESSURE: 80 MMHG | HEIGHT: 60 IN

## 2024-05-15 DIAGNOSIS — Z86.39 PERSONAL HISTORY OF OTHER ENDOCRINE, NUTRITIONAL AND METABOLIC DISEASE: ICD-10-CM

## 2024-05-15 DIAGNOSIS — N81.6 CYSTOCELE, UNSPECIFIED: ICD-10-CM

## 2024-05-15 DIAGNOSIS — Z78.0 ASYMPTOMATIC MENOPAUSAL STATE: ICD-10-CM

## 2024-05-15 DIAGNOSIS — N81.10 CYSTOCELE, UNSPECIFIED: ICD-10-CM

## 2024-05-15 DIAGNOSIS — Z13.820 ENCOUNTER FOR SCREENING FOR OSTEOPOROSIS: ICD-10-CM

## 2024-05-15 DIAGNOSIS — Z12.39 ENCOUNTER FOR OTHER SCREENING FOR MALIGNANT NEOPLASM OF BREAST: ICD-10-CM

## 2024-05-15 DIAGNOSIS — Z85.828 PERSONAL HISTORY OF OTHER MALIGNANT NEOPLASM OF SKIN: ICD-10-CM

## 2024-05-15 DIAGNOSIS — Z01.419 ENCOUNTER FOR GYNECOLOGICAL EXAMINATION (GENERAL) (ROUTINE) W/OUT ABNORMAL FINDINGS: ICD-10-CM

## 2024-05-15 PROCEDURE — 99397 PER PM REEVAL EST PAT 65+ YR: CPT

## 2024-05-15 PROCEDURE — 99459 PELVIC EXAMINATION: CPT

## 2024-05-15 RX ORDER — DOXYCYCLINE HYCLATE 100 MG/1
100 CAPSULE ORAL
Qty: 28 | Refills: 0 | Status: DISCONTINUED | COMMUNITY
Start: 2024-02-15 | End: 2024-05-15

## 2024-05-15 NOTE — HISTORY OF PRESENT ILLNESS
[N] : Patient is not sexually active [Menarche Age: ____] : age at menarche was [unfilled] [Menopause Age: ____] : age at menopause was [unfilled] [PGHxTotal] : 3 [[facility] : 2 [PGHxPremature] : 0 [PGHxAbortions] : 1 [[facility]] : 2 [PGHxABInduced] : 1 [PGHxABSpont] : 0 [PGHxEctopic] : 0 [PGHxMultBirths] : 0

## 2024-05-15 NOTE — PHYSICAL EXAM
[Chaperone Present] : A chaperone was present in the examining room during all aspects of the physical examination [[id number]] : A chaperone was present during the pelvic exam. [Appropriately responsive] : appropriately responsive [Alert] : alert [No Acute Distress] : no acute distress [Soft] : soft [Non-tender] : non-tender [Non-distended] : non-distended [No HSM] : No HSM [No Lesions] : no lesions [No Mass] : no mass [Oriented x3] : oriented x3 [Examination Of The Breasts] : a normal appearance [No Masses] : no breast masses were palpable [Vulvar Atrophy] : vulvar atrophy [Labia Majora] : normal [Labia Minora] : normal [Normal] : normal [Atrophy] : atrophy [Cystocele] : a cystocele [Rectocele] : a rectocele [Absent] : absent [Uterine Adnexae] : absent [No Tenderness] : no tenderness [Nl Sphincter Tone] : normal sphincter tone

## 2024-06-19 ENCOUNTER — NON-APPOINTMENT (OUTPATIENT)
Age: 69
End: 2024-06-19

## 2024-07-05 ENCOUNTER — INPATIENT (INPATIENT)
Facility: HOSPITAL | Age: 69
LOS: 0 days | Discharge: HOME | DRG: 392 | End: 2024-07-06
Attending: SURGERY | Admitting: SURGERY
Payer: MEDICARE

## 2024-07-05 VITALS
WEIGHT: 139.77 LBS | OXYGEN SATURATION: 98 % | SYSTOLIC BLOOD PRESSURE: 157 MMHG | TEMPERATURE: 98 F | DIASTOLIC BLOOD PRESSURE: 73 MMHG | HEART RATE: 73 BPM | RESPIRATION RATE: 18 BRPM

## 2024-07-05 DIAGNOSIS — K57.80 DIVERTICULITIS OF INTESTINE, PART UNSPECIFIED, WITH PERFORATION AND ABSCESS WITHOUT BLEEDING: ICD-10-CM

## 2024-07-05 DIAGNOSIS — Z90.710 ACQUIRED ABSENCE OF BOTH CERVIX AND UTERUS: Chronic | ICD-10-CM

## 2024-07-05 LAB
ALBUMIN SERPL ELPH-MCNC: 3.8 G/DL — SIGNIFICANT CHANGE UP (ref 3.3–5.2)
ALBUMIN SERPL ELPH-MCNC: 4.3 G/DL — SIGNIFICANT CHANGE UP (ref 3.3–5.2)
ALP SERPL-CCNC: 62 U/L — SIGNIFICANT CHANGE UP (ref 40–120)
ALP SERPL-CCNC: 66 U/L — SIGNIFICANT CHANGE UP (ref 40–120)
ALT FLD-CCNC: 12 U/L — SIGNIFICANT CHANGE UP
ALT FLD-CCNC: 13 U/L — SIGNIFICANT CHANGE UP
ANION GAP SERPL CALC-SCNC: 14 MMOL/L — SIGNIFICANT CHANGE UP (ref 5–17)
ANION GAP SERPL CALC-SCNC: 14 MMOL/L — SIGNIFICANT CHANGE UP (ref 5–17)
APPEARANCE UR: CLEAR — SIGNIFICANT CHANGE UP
APTT BLD: 33.9 SEC — SIGNIFICANT CHANGE UP (ref 24.5–35.6)
AST SERPL-CCNC: 20 U/L — SIGNIFICANT CHANGE UP
AST SERPL-CCNC: 24 U/L — SIGNIFICANT CHANGE UP
BACTERIA # UR AUTO: NEGATIVE /HPF — SIGNIFICANT CHANGE UP
BASOPHILS # BLD AUTO: 0.05 K/UL — SIGNIFICANT CHANGE UP (ref 0–0.2)
BASOPHILS # BLD AUTO: 0.06 K/UL — SIGNIFICANT CHANGE UP (ref 0–0.2)
BASOPHILS NFR BLD AUTO: 0.5 % — SIGNIFICANT CHANGE UP (ref 0–2)
BASOPHILS NFR BLD AUTO: 0.6 % — SIGNIFICANT CHANGE UP (ref 0–2)
BILIRUB SERPL-MCNC: 0.7 MG/DL — SIGNIFICANT CHANGE UP (ref 0.4–2)
BILIRUB SERPL-MCNC: 0.8 MG/DL — SIGNIFICANT CHANGE UP (ref 0.4–2)
BILIRUB UR-MCNC: NEGATIVE — SIGNIFICANT CHANGE UP
BUN SERPL-MCNC: 10.1 MG/DL — SIGNIFICANT CHANGE UP (ref 8–20)
BUN SERPL-MCNC: 13.8 MG/DL — SIGNIFICANT CHANGE UP (ref 8–20)
CALCIUM SERPL-MCNC: 8.9 MG/DL — SIGNIFICANT CHANGE UP (ref 8.4–10.5)
CALCIUM SERPL-MCNC: 9.1 MG/DL — SIGNIFICANT CHANGE UP (ref 8.4–10.5)
CAST: 1 /LPF — SIGNIFICANT CHANGE UP (ref 0–4)
CHLORIDE SERPL-SCNC: 102 MMOL/L — SIGNIFICANT CHANGE UP (ref 96–108)
CHLORIDE SERPL-SCNC: 104 MMOL/L — SIGNIFICANT CHANGE UP (ref 96–108)
CO2 SERPL-SCNC: 22 MMOL/L — SIGNIFICANT CHANGE UP (ref 22–29)
CO2 SERPL-SCNC: 25 MMOL/L — SIGNIFICANT CHANGE UP (ref 22–29)
COLOR SPEC: YELLOW — SIGNIFICANT CHANGE UP
CREAT SERPL-MCNC: 0.61 MG/DL — SIGNIFICANT CHANGE UP (ref 0.5–1.3)
CREAT SERPL-MCNC: 0.83 MG/DL — SIGNIFICANT CHANGE UP (ref 0.5–1.3)
DIFF PNL FLD: NEGATIVE — SIGNIFICANT CHANGE UP
EGFR: 76 ML/MIN/1.73M2 — SIGNIFICANT CHANGE UP
EGFR: 76 ML/MIN/1.73M2 — SIGNIFICANT CHANGE UP
EGFR: 96 ML/MIN/1.73M2 — SIGNIFICANT CHANGE UP
EGFR: 96 ML/MIN/1.73M2 — SIGNIFICANT CHANGE UP
EOSINOPHIL # BLD AUTO: 0.05 K/UL — SIGNIFICANT CHANGE UP (ref 0–0.5)
EOSINOPHIL # BLD AUTO: 0.05 K/UL — SIGNIFICANT CHANGE UP (ref 0–0.5)
EOSINOPHIL NFR BLD AUTO: 0.5 % — SIGNIFICANT CHANGE UP (ref 0–6)
EOSINOPHIL NFR BLD AUTO: 0.5 % — SIGNIFICANT CHANGE UP (ref 0–6)
GLUCOSE SERPL-MCNC: 100 MG/DL — HIGH (ref 70–99)
GLUCOSE SERPL-MCNC: 95 MG/DL — SIGNIFICANT CHANGE UP (ref 70–99)
GLUCOSE UR QL: NEGATIVE MG/DL — SIGNIFICANT CHANGE UP
HCT VFR BLD CALC: 39.6 % — SIGNIFICANT CHANGE UP (ref 34.5–45)
HCT VFR BLD CALC: 42.2 % — SIGNIFICANT CHANGE UP (ref 34.5–45)
HGB BLD-MCNC: 13.3 G/DL — SIGNIFICANT CHANGE UP (ref 11.5–15.5)
HGB BLD-MCNC: 14 G/DL — SIGNIFICANT CHANGE UP (ref 11.5–15.5)
IMM GRANULOCYTES NFR BLD AUTO: 0.3 % — SIGNIFICANT CHANGE UP (ref 0–0.9)
IMM GRANULOCYTES NFR BLD AUTO: 0.3 % — SIGNIFICANT CHANGE UP (ref 0–0.9)
INR BLD: 1.07 RATIO — SIGNIFICANT CHANGE UP (ref 0.85–1.18)
KETONES UR-MCNC: NEGATIVE MG/DL — SIGNIFICANT CHANGE UP
LEUKOCYTE ESTERASE UR-ACNC: ABNORMAL
LIDOCAIN IGE QN: 27 U/L — SIGNIFICANT CHANGE UP (ref 22–51)
LYMPHOCYTES # BLD AUTO: 1.23 K/UL — SIGNIFICANT CHANGE UP (ref 1–3.3)
LYMPHOCYTES # BLD AUTO: 1.32 K/UL — SIGNIFICANT CHANGE UP (ref 1–3.3)
LYMPHOCYTES # BLD AUTO: 12.6 % — LOW (ref 13–44)
LYMPHOCYTES # BLD AUTO: 13.3 % — SIGNIFICANT CHANGE UP (ref 13–44)
MCHC RBC-ENTMCNC: 29.4 PG — SIGNIFICANT CHANGE UP (ref 27–34)
MCHC RBC-ENTMCNC: 30.1 PG — SIGNIFICANT CHANGE UP (ref 27–34)
MCHC RBC-ENTMCNC: 33.2 GM/DL — SIGNIFICANT CHANGE UP (ref 32–36)
MCHC RBC-ENTMCNC: 33.6 GM/DL — SIGNIFICANT CHANGE UP (ref 32–36)
MCV RBC AUTO: 88.5 FL — SIGNIFICANT CHANGE UP (ref 80–100)
MCV RBC AUTO: 89.6 FL — SIGNIFICANT CHANGE UP (ref 80–100)
MONOCYTES # BLD AUTO: 0.82 K/UL — SIGNIFICANT CHANGE UP (ref 0–0.9)
MONOCYTES # BLD AUTO: 0.94 K/UL — HIGH (ref 0–0.9)
MONOCYTES NFR BLD AUTO: 8.8 % — SIGNIFICANT CHANGE UP (ref 2–14)
MONOCYTES NFR BLD AUTO: 9 % — SIGNIFICANT CHANGE UP (ref 2–14)
NEUTROPHILS # BLD AUTO: 7.09 K/UL — SIGNIFICANT CHANGE UP (ref 1.8–7.4)
NEUTROPHILS # BLD AUTO: 8.09 K/UL — HIGH (ref 1.8–7.4)
NEUTROPHILS NFR BLD AUTO: 76.6 % — SIGNIFICANT CHANGE UP (ref 43–77)
NEUTROPHILS NFR BLD AUTO: 77 % — SIGNIFICANT CHANGE UP (ref 43–77)
NITRITE UR-MCNC: NEGATIVE — SIGNIFICANT CHANGE UP
PH UR: 6.5 — SIGNIFICANT CHANGE UP (ref 5–8)
PLATELET # BLD AUTO: 200 K/UL — SIGNIFICANT CHANGE UP (ref 150–400)
PLATELET # BLD AUTO: 234 K/UL — SIGNIFICANT CHANGE UP (ref 150–400)
POTASSIUM SERPL-MCNC: 3.8 MMOL/L — SIGNIFICANT CHANGE UP (ref 3.5–5.3)
POTASSIUM SERPL-MCNC: 4.5 MMOL/L — SIGNIFICANT CHANGE UP (ref 3.5–5.3)
POTASSIUM SERPL-SCNC: 3.8 MMOL/L — SIGNIFICANT CHANGE UP (ref 3.5–5.3)
POTASSIUM SERPL-SCNC: 4.5 MMOL/L — SIGNIFICANT CHANGE UP (ref 3.5–5.3)
PROT SERPL-MCNC: 6.6 G/DL — SIGNIFICANT CHANGE UP (ref 6.6–8.7)
PROT SERPL-MCNC: 7.1 G/DL — SIGNIFICANT CHANGE UP (ref 6.6–8.7)
PROT UR-MCNC: NEGATIVE MG/DL — SIGNIFICANT CHANGE UP
PROTHROM AB SERPL-ACNC: 11.8 SEC — SIGNIFICANT CHANGE UP (ref 9.5–13)
RBC # BLD: 4.42 M/UL — SIGNIFICANT CHANGE UP (ref 3.8–5.2)
RBC # BLD: 4.77 M/UL — SIGNIFICANT CHANGE UP (ref 3.8–5.2)
RBC # FLD: 13.7 % — SIGNIFICANT CHANGE UP (ref 10.3–14.5)
RBC # FLD: 13.8 % — SIGNIFICANT CHANGE UP (ref 10.3–14.5)
RBC CASTS # UR COMP ASSIST: 0 /HPF — SIGNIFICANT CHANGE UP (ref 0–4)
SODIUM SERPL-SCNC: 140 MMOL/L — SIGNIFICANT CHANGE UP (ref 135–145)
SODIUM SERPL-SCNC: 141 MMOL/L — SIGNIFICANT CHANGE UP (ref 135–145)
SP GR SPEC: 1.01 — SIGNIFICANT CHANGE UP (ref 1–1.03)
SQUAMOUS # UR AUTO: 4 /HPF — SIGNIFICANT CHANGE UP (ref 0–5)
UROBILINOGEN FLD QL: 0.2 MG/DL — SIGNIFICANT CHANGE UP (ref 0.2–1)
WBC # BLD: 10.49 K/UL — SIGNIFICANT CHANGE UP (ref 3.8–10.5)
WBC # BLD: 9.27 K/UL — SIGNIFICANT CHANGE UP (ref 3.8–10.5)
WBC # FLD AUTO: 10.49 K/UL — SIGNIFICANT CHANGE UP (ref 3.8–10.5)
WBC # FLD AUTO: 9.27 K/UL — SIGNIFICANT CHANGE UP (ref 3.8–10.5)
WBC UR QL: 10 /HPF — HIGH (ref 0–5)

## 2024-07-05 PROCEDURE — 99285 EMERGENCY DEPT VISIT HI MDM: CPT

## 2024-07-05 PROCEDURE — 74177 CT ABD & PELVIS W/CONTRAST: CPT | Mod: 26,MC

## 2024-07-05 RX ORDER — SODIUM CHLORIDE 9 G/1000ML
1000 INJECTION, SOLUTION INTRAVENOUS
Refills: 0 | Status: DISCONTINUED | OUTPATIENT
Start: 2024-07-05 | End: 2024-07-06

## 2024-07-05 RX ORDER — SODIUM CHLORIDE 9 G/1000ML
1000 INJECTION, SOLUTION INTRAVENOUS ONCE
Refills: 0 | Status: COMPLETED | OUTPATIENT
Start: 2024-07-05 | End: 2024-07-05

## 2024-07-05 RX ORDER — KETOROLAC TROMETHAMINE 30 MG/ML
15 INJECTION, SOLUTION INTRAMUSCULAR; INTRAVENOUS ONCE
Refills: 0 | Status: DISCONTINUED | OUTPATIENT
Start: 2024-07-05 | End: 2024-07-05

## 2024-07-05 RX ORDER — CIPROFLOXACIN HCL 250 MG
400 TABLET ORAL EVERY 12 HOURS
Refills: 0 | Status: DISCONTINUED | OUTPATIENT
Start: 2024-07-05 | End: 2024-07-06

## 2024-07-05 RX ORDER — IBUPROFEN 200 MG
600 TABLET ORAL EVERY 6 HOURS
Refills: 0 | Status: DISCONTINUED | OUTPATIENT
Start: 2024-07-05 | End: 2024-07-06

## 2024-07-05 RX ORDER — PIPERACILLIN-TAZO-DEXTROSE,ISO 3.375G/5
3.38 IV SOLUTION, PIGGYBACK PREMIX FROZEN(ML) INTRAVENOUS ONCE
Refills: 0 | Status: COMPLETED | OUTPATIENT
Start: 2024-07-05 | End: 2024-07-05

## 2024-07-05 RX ORDER — ATORVASTATIN CALCIUM 80 MG/1
10 TABLET, FILM COATED ORAL DAILY
Refills: 0 | Status: DISCONTINUED | OUTPATIENT
Start: 2024-07-05 | End: 2024-07-06

## 2024-07-05 RX ORDER — ATORVASTATIN CALCIUM 80 MG/1
1 TABLET, FILM COATED ORAL
Refills: 0 | DISCHARGE

## 2024-07-05 RX ORDER — ACETAMINOPHEN 500 MG/5ML
975 LIQUID (ML) ORAL EVERY 6 HOURS
Refills: 0 | Status: DISCONTINUED | OUTPATIENT
Start: 2024-07-05 | End: 2024-07-06

## 2024-07-05 RX ORDER — ENOXAPARIN SODIUM 100 MG/ML
40 INJECTION SUBCUTANEOUS EVERY 24 HOURS
Refills: 0 | Status: DISCONTINUED | OUTPATIENT
Start: 2024-07-05 | End: 2024-07-06

## 2024-07-05 RX ORDER — METRONIDAZOLE 250 MG
500 TABLET ORAL EVERY 12 HOURS
Refills: 0 | Status: DISCONTINUED | OUTPATIENT
Start: 2024-07-05 | End: 2024-07-06

## 2024-07-05 RX ADMIN — Medication 200 MILLIGRAM(S): at 17:15

## 2024-07-05 RX ADMIN — SODIUM CHLORIDE 1000 MILLILITER(S): 9 INJECTION, SOLUTION INTRAVENOUS at 10:15

## 2024-07-05 RX ADMIN — Medication 100 MILLIGRAM(S): at 17:15

## 2024-07-05 RX ADMIN — SODIUM CHLORIDE 84 MILLILITER(S): 9 INJECTION, SOLUTION INTRAVENOUS at 17:15

## 2024-07-05 RX ADMIN — Medication 200 GRAM(S): at 12:35

## 2024-07-05 RX ADMIN — ATORVASTATIN CALCIUM 10 MILLIGRAM(S): 80 TABLET, FILM COATED ORAL at 13:33

## 2024-07-06 ENCOUNTER — TRANSCRIPTION ENCOUNTER (OUTPATIENT)
Age: 69
End: 2024-07-06

## 2024-07-06 VITALS
SYSTOLIC BLOOD PRESSURE: 141 MMHG | RESPIRATION RATE: 18 BRPM | TEMPERATURE: 98 F | OXYGEN SATURATION: 96 % | DIASTOLIC BLOOD PRESSURE: 72 MMHG | HEART RATE: 63 BPM

## 2024-07-06 LAB
ANION GAP SERPL CALC-SCNC: 14 MMOL/L — SIGNIFICANT CHANGE UP (ref 5–17)
BUN SERPL-MCNC: 7.9 MG/DL — LOW (ref 8–20)
CALCIUM SERPL-MCNC: 8.8 MG/DL — SIGNIFICANT CHANGE UP (ref 8.4–10.5)
CHLORIDE SERPL-SCNC: 102 MMOL/L — SIGNIFICANT CHANGE UP (ref 96–108)
CO2 SERPL-SCNC: 24 MMOL/L — SIGNIFICANT CHANGE UP (ref 22–29)
CREAT SERPL-MCNC: 0.75 MG/DL — SIGNIFICANT CHANGE UP (ref 0.5–1.3)
CULTURE RESULTS: SIGNIFICANT CHANGE UP
EGFR: 86 ML/MIN/1.73M2 — SIGNIFICANT CHANGE UP
EGFR: 86 ML/MIN/1.73M2 — SIGNIFICANT CHANGE UP
GLUCOSE SERPL-MCNC: 105 MG/DL — HIGH (ref 70–99)
HCT VFR BLD CALC: 37.8 % — SIGNIFICANT CHANGE UP (ref 34.5–45)
HGB BLD-MCNC: 12.5 G/DL — SIGNIFICANT CHANGE UP (ref 11.5–15.5)
MAGNESIUM SERPL-MCNC: 2 MG/DL — SIGNIFICANT CHANGE UP (ref 1.6–2.6)
MCHC RBC-ENTMCNC: 29.6 PG — SIGNIFICANT CHANGE UP (ref 27–34)
MCHC RBC-ENTMCNC: 33.1 GM/DL — SIGNIFICANT CHANGE UP (ref 32–36)
MCV RBC AUTO: 89.4 FL — SIGNIFICANT CHANGE UP (ref 80–100)
PHOSPHATE SERPL-MCNC: 3.7 MG/DL — SIGNIFICANT CHANGE UP (ref 2.4–4.7)
PLATELET # BLD AUTO: 207 K/UL — SIGNIFICANT CHANGE UP (ref 150–400)
POTASSIUM SERPL-MCNC: 4 MMOL/L — SIGNIFICANT CHANGE UP (ref 3.5–5.3)
POTASSIUM SERPL-SCNC: 4 MMOL/L — SIGNIFICANT CHANGE UP (ref 3.5–5.3)
RBC # BLD: 4.23 M/UL — SIGNIFICANT CHANGE UP (ref 3.8–5.2)
RBC # FLD: 13.7 % — SIGNIFICANT CHANGE UP (ref 10.3–14.5)
SODIUM SERPL-SCNC: 140 MMOL/L — SIGNIFICANT CHANGE UP (ref 135–145)
SPECIMEN SOURCE: SIGNIFICANT CHANGE UP
WBC # BLD: 7.63 K/UL — SIGNIFICANT CHANGE UP (ref 3.8–10.5)
WBC # FLD AUTO: 7.63 K/UL — SIGNIFICANT CHANGE UP (ref 3.8–10.5)

## 2024-07-06 PROCEDURE — 36415 COLL VENOUS BLD VENIPUNCTURE: CPT

## 2024-07-06 PROCEDURE — 85730 THROMBOPLASTIN TIME PARTIAL: CPT

## 2024-07-06 PROCEDURE — 80053 COMPREHEN METABOLIC PANEL: CPT

## 2024-07-06 PROCEDURE — 83690 ASSAY OF LIPASE: CPT

## 2024-07-06 PROCEDURE — 85027 COMPLETE CBC AUTOMATED: CPT

## 2024-07-06 PROCEDURE — 85610 PROTHROMBIN TIME: CPT

## 2024-07-06 PROCEDURE — 74177 CT ABD & PELVIS W/CONTRAST: CPT | Mod: MC

## 2024-07-06 PROCEDURE — 85025 COMPLETE CBC W/AUTO DIFF WBC: CPT

## 2024-07-06 PROCEDURE — 84100 ASSAY OF PHOSPHORUS: CPT

## 2024-07-06 PROCEDURE — 87086 URINE CULTURE/COLONY COUNT: CPT

## 2024-07-06 PROCEDURE — 99222 1ST HOSP IP/OBS MODERATE 55: CPT

## 2024-07-06 PROCEDURE — 99285 EMERGENCY DEPT VISIT HI MDM: CPT | Mod: 25

## 2024-07-06 PROCEDURE — 83735 ASSAY OF MAGNESIUM: CPT

## 2024-07-06 PROCEDURE — 81001 URINALYSIS AUTO W/SCOPE: CPT

## 2024-07-06 PROCEDURE — 80048 BASIC METABOLIC PNL TOTAL CA: CPT

## 2024-07-06 PROCEDURE — 96374 THER/PROPH/DIAG INJ IV PUSH: CPT

## 2024-07-06 RX ORDER — CIPROFLOXACIN HCL 250 MG
1 TABLET ORAL
Qty: 0 | Refills: 0 | DISCHARGE
Start: 2024-07-06

## 2024-07-06 RX ORDER — CIPROFLOXACIN HCL 250 MG
1 TABLET ORAL
Qty: 24 | Refills: 0
Start: 2024-07-06 | End: 2024-07-17

## 2024-07-06 RX ORDER — METRONIDAZOLE 250 MG
500 TABLET ORAL EVERY 8 HOURS
Refills: 0 | Status: DISCONTINUED | OUTPATIENT
Start: 2024-07-06 | End: 2024-07-06

## 2024-07-06 RX ORDER — CIPROFLOXACIN HCL 250 MG
500 TABLET ORAL EVERY 12 HOURS
Refills: 0 | Status: DISCONTINUED | OUTPATIENT
Start: 2024-07-06 | End: 2024-07-06

## 2024-07-06 RX ORDER — METRONIDAZOLE 250 MG
1 TABLET ORAL
Qty: 36 | Refills: 0
Start: 2024-07-06 | End: 2024-07-17

## 2024-07-06 RX ADMIN — Medication 100 MILLIGRAM(S): at 05:03

## 2024-07-06 RX ADMIN — Medication 500 MILLIGRAM(S): at 20:27

## 2024-07-06 RX ADMIN — Medication 500 MILLIGRAM(S): at 19:06

## 2024-07-06 RX ADMIN — Medication 200 MILLIGRAM(S): at 06:08

## 2024-07-06 RX ADMIN — ATORVASTATIN CALCIUM 10 MILLIGRAM(S): 80 TABLET, FILM COATED ORAL at 12:37

## 2024-07-07 RX ORDER — METRONIDAZOLE 250 MG
1 TABLET ORAL
Qty: 36 | Refills: 0
Start: 2024-07-07 | End: 2024-07-18

## 2024-07-07 RX ORDER — CIPROFLOXACIN HCL 250 MG
1 TABLET ORAL
Qty: 24 | Refills: 0
Start: 2024-07-07 | End: 2024-07-18

## 2024-07-08 PROBLEM — E78.5 HYPERLIPIDEMIA, UNSPECIFIED: Chronic | Status: ACTIVE | Noted: 2024-07-05

## 2024-07-12 ENCOUNTER — NON-APPOINTMENT (OUTPATIENT)
Age: 69
End: 2024-07-12

## 2024-08-05 ENCOUNTER — APPOINTMENT (OUTPATIENT)
Dept: COLORECTAL SURGERY | Facility: CLINIC | Age: 69
End: 2024-08-05

## 2024-08-05 PROBLEM — K57.32 DIVERTICULITIS OF COLON: Status: ACTIVE | Noted: 2024-08-05

## 2024-08-05 PROCEDURE — 99214 OFFICE O/P EST MOD 30 MIN: CPT

## 2024-08-05 NOTE — PHYSICAL EXAM
[Respiratory Effort] : normal respiratory effort [Calm] : calm [de-identified] : Soft, nontender, nondistended.  No mass or hernias appreciated [de-identified] : Well-appearing, in no distress [de-identified] : Normocephalic, atraumatic [de-identified] : Moves extremities without difficulty [de-identified] : Warm and dry [de-identified] : Alert and oriented x 3

## 2024-08-05 NOTE — PLAN
[TextEntry] : 69-year-old female with history of diverticulitis that has now resolved.  I discussed the role of surgery in patients with complicated diverticulitis.  She is not interested in pursuing surgery which is reasonable.  She will follow-up with GI for colonoscopy.  I discussed the COSMID trial but she does not want to be randomized into surgery.  Follow-up as needed.

## 2024-08-05 NOTE — PHYSICAL EXAM
[Respiratory Effort] : normal respiratory effort [Calm] : calm [de-identified] : Soft, nontender, nondistended.  No mass or hernias appreciated [de-identified] : Well-appearing, in no distress [de-identified] : Normocephalic, atraumatic [de-identified] : Moves extremities without difficulty [de-identified] : Warm and dry [de-identified] : Alert and oriented x 3

## 2024-08-05 NOTE — DATA REVIEWED
[FreeTextEntry1] : CT scan  IMPRESSION:  Diverticulitis involving the distal descending colon with small adjacent droplets of extraluminal air consistent with a localized perforation.  No abscess.  Dr. Carlito Frost discussed findings

## 2024-08-05 NOTE — HISTORY OF PRESENT ILLNESS
[FreeTextEntry1] : 69 year old female who presents for a post hospitalization follow up visit after being treated for first episode of diverticulitis.  She presented with perforation of her distal descending colon and was treated with antibiotics which she has since completed.  She has no abdominal pain, fevers, nausea or vomiting.  She has had several colonoscopies in the past and is due for another one in 2025.

## 2024-08-29 ENCOUNTER — NON-APPOINTMENT (OUTPATIENT)
Age: 69
End: 2024-08-29

## 2024-08-29 ENCOUNTER — APPOINTMENT (OUTPATIENT)
Dept: FAMILY MEDICINE | Facility: CLINIC | Age: 69
End: 2024-08-29
Payer: MEDICARE

## 2024-08-29 VITALS
TEMPERATURE: 98.7 F | BODY MASS INDEX: 27.09 KG/M2 | OXYGEN SATURATION: 98 % | SYSTOLIC BLOOD PRESSURE: 136 MMHG | RESPIRATION RATE: 16 BRPM | HEART RATE: 82 BPM | HEIGHT: 60 IN | DIASTOLIC BLOOD PRESSURE: 80 MMHG | WEIGHT: 138 LBS

## 2024-08-29 DIAGNOSIS — Z83.3 FAMILY HISTORY OF DIABETES MELLITUS: ICD-10-CM

## 2024-08-29 DIAGNOSIS — K57.32 DIVERTICULITIS OF LARGE INTESTINE W/OUT PERFORATION OR ABSCESS W/OUT BLEEDING: ICD-10-CM

## 2024-08-29 DIAGNOSIS — Z78.9 OTHER SPECIFIED HEALTH STATUS: ICD-10-CM

## 2024-08-29 DIAGNOSIS — Z80.3 FAMILY HISTORY OF MALIGNANT NEOPLASM OF BREAST: ICD-10-CM

## 2024-08-29 DIAGNOSIS — Z82.49 FAMILY HISTORY OF ISCHEMIC HEART DISEASE AND OTHER DISEASES OF THE CIRCULATORY SYSTEM: ICD-10-CM

## 2024-08-29 DIAGNOSIS — Z80.1 FAMILY HISTORY OF MALIGNANT NEOPLASM OF TRACHEA, BRONCHUS AND LUNG: ICD-10-CM

## 2024-08-29 PROCEDURE — 99213 OFFICE O/P EST LOW 20 MIN: CPT

## 2024-08-29 RX ORDER — CIPROFLOXACIN HYDROCHLORIDE 500 MG/1
500 TABLET, FILM COATED ORAL
Qty: 20 | Refills: 1 | Status: ACTIVE | COMMUNITY
Start: 2024-08-29 | End: 1900-01-01

## 2024-08-29 RX ORDER — METRONIDAZOLE 500 MG/1
500 TABLET ORAL
Qty: 30 | Refills: 1 | Status: ACTIVE | COMMUNITY
Start: 2024-08-29 | End: 1900-01-01

## 2024-08-29 NOTE — PHYSICAL EXAM
[Well Nourished] : well nourished [Well Developed] : well developed [Well-Appearing] : well-appearing [Normal Sclera/Conjunctiva] : normal sclera/conjunctiva [PERRL] : pupils equal round and reactive to light [EOMI] : extraocular movements intact [Normal Outer Ear/Nose] : the outer ears and nose were normal in appearance [Normal Oropharynx] : the oropharynx was normal [No JVD] : no jugular venous distention [No Lymphadenopathy] : no lymphadenopathy [Supple] : supple [Thyroid Normal, No Nodules] : the thyroid was normal and there were no nodules present [No Respiratory Distress] : no respiratory distress  [No Accessory Muscle Use] : no accessory muscle use [Clear to Auscultation] : lungs were clear to auscultation bilaterally [Normal Rate] : normal rate  [Regular Rhythm] : with a regular rhythm [Normal S1, S2] : normal S1 and S2 [No Murmur] : no murmur heard [No Carotid Bruits] : no carotid bruits [No Abdominal Bruit] : a ~M bruit was not heard ~T in the abdomen [No Varicosities] : no varicosities [Pedal Pulses Present] : the pedal pulses are present [No Edema] : there was no peripheral edema [No Palpable Aorta] : no palpable aorta [No Extremity Clubbing/Cyanosis] : no extremity clubbing/cyanosis [Soft] : abdomen soft [Non Tender] : non-tender [Non-distended] : non-distended [No Masses] : no abdominal mass palpated [No HSM] : no HSM [Normal Bowel Sounds] : normal bowel sounds [No CVA Tenderness] : no CVA  tenderness [No Spinal Tenderness] : no spinal tenderness [No Joint Swelling] : no joint swelling [Grossly Normal Strength/Tone] : grossly normal strength/tone [No Rash] : no rash [Coordination Grossly Intact] : coordination grossly intact [No Focal Deficits] : no focal deficits [Normal Gait] : normal gait [Deep Tendon Reflexes (DTR)] : deep tendon reflexes were 2+ and symmetric [___/1] : [unfilled]/1   [___/3] : [unfilled]/3 [___/5] : [unfilled]/5 [___/4] : [unfilled]/4 [___/2] : [unfilled]/2 [___/8] : [unfilled]/8 [Normal] : Normal [Normal Affect] : the affect was normal [Normal Insight/Judgement] : insight and judgment were intact [Comprehensive Foot Exam Normal] : Right and left foot were examined and both feet are normal. No ulcers in either foot. Toes are normal and with full ROM.  Normal tactile sensation with monofilament testing throughout both feet [de-identified] : Overweight [TextBox_2] : yes [TextBox_4] : HS [SlumsTotal] : 30

## 2024-08-29 NOTE — HISTORY OF PRESENT ILLNESS
[FreeTextEntry1] : F/U Apt. S/P Flare of Diverticulitis [de-identified] : F/U Apt. S/P Flare of Diverticulitis

## 2024-08-29 NOTE — HEALTH RISK ASSESSMENT
[Yes] : Yes [Monthly or less (1 pt)] : Monthly or less (1 point) [1 or 2 (0 pts)] : 1 or 2 (0 points) [Never (0 pts)] : Never (0 points) [No] : In the past 12 months have you used drugs other than those required for medical reasons? No [No falls in past year] : Patient reported no falls in the past year [0] : 2) Feeling down, depressed, or hopeless: Not at all (0) [PHQ-2 Negative - No further assessment needed] : PHQ-2 Negative - No further assessment needed [Patient/Caregiver not ready to engage] : , patient/caregiver not ready to engage [I will adhere to the patient's wishes.] : I will adhere to the patient's wishes. [Time Spent: ___ minutes] : Time Spent: [unfilled] minutes [Never] : Never [Audit-CScore] : 0 [de-identified] : Average [de-identified] : Average [facility] : 8 [[id number]] : 0 [AdvancecareDate] : 10/23

## 2024-10-08 ENCOUNTER — APPOINTMENT (OUTPATIENT)
Dept: FAMILY MEDICINE | Facility: CLINIC | Age: 69
End: 2024-10-08
Payer: MEDICARE

## 2024-10-08 ENCOUNTER — LABORATORY RESULT (OUTPATIENT)
Age: 69
End: 2024-10-08

## 2024-10-08 VITALS
HEART RATE: 69 BPM | BODY MASS INDEX: 27.48 KG/M2 | OXYGEN SATURATION: 98 % | RESPIRATION RATE: 14 BRPM | SYSTOLIC BLOOD PRESSURE: 120 MMHG | HEIGHT: 60 IN | WEIGHT: 140 LBS | DIASTOLIC BLOOD PRESSURE: 80 MMHG

## 2024-10-08 DIAGNOSIS — Z23 ENCOUNTER FOR IMMUNIZATION: ICD-10-CM

## 2024-10-08 DIAGNOSIS — M54.50 LOW BACK PAIN, UNSPECIFIED: ICD-10-CM

## 2024-10-08 DIAGNOSIS — K80.20 CALCULUS OF GALLBLADDER W/OUT CHOLECYSTITIS W/OUT OBSTRUCTION: ICD-10-CM

## 2024-10-08 PROCEDURE — G0008: CPT

## 2024-10-08 PROCEDURE — G2211 COMPLEX E/M VISIT ADD ON: CPT

## 2024-10-08 PROCEDURE — 36415 COLL VENOUS BLD VENIPUNCTURE: CPT

## 2024-10-08 PROCEDURE — 90662 IIV NO PRSV INCREASED AG IM: CPT

## 2024-10-08 PROCEDURE — 99214 OFFICE O/P EST MOD 30 MIN: CPT

## 2024-10-11 LAB
25(OH)D3 SERPL-MCNC: 91.9 NG/ML
ALBUMIN SERPL ELPH-MCNC: 4.7 G/DL
ALP BLD-CCNC: 56 U/L
ALT SERPL-CCNC: 23 U/L
ANION GAP SERPL CALC-SCNC: 15 MMOL/L
APPEARANCE: CLEAR
AST SERPL-CCNC: 21 U/L
BASOPHILS # BLD AUTO: 0.05 K/UL
BASOPHILS NFR BLD AUTO: 1.3 %
BILIRUB SERPL-MCNC: 0.7 MG/DL
BILIRUBIN URINE: NEGATIVE
BLOOD URINE: NEGATIVE
BUN SERPL-MCNC: 14 MG/DL
C PEPTIDE SERPL-MCNC: 2.2 NG/ML
CALCIUM SERPL-MCNC: 9.6 MG/DL
CHLORIDE SERPL-SCNC: 102 MMOL/L
CHOLEST SERPL-MCNC: 210 MG/DL
CO2 SERPL-SCNC: 23 MMOL/L
COLOR: YELLOW
CREAT SERPL-MCNC: 0.87 MG/DL
EGFR: 72 ML/MIN/1.73M2
EOSINOPHIL # BLD AUTO: 0.05 K/UL
EOSINOPHIL NFR BLD AUTO: 1.3 %
ESTIMATED AVERAGE GLUCOSE: 108 MG/DL
FERRITIN SERPL-MCNC: 173 NG/ML
FOLATE SERPL-MCNC: >20 NG/ML
GLUCOSE QUALITATIVE U: NEGATIVE MG/DL
GLUCOSE SERPL-MCNC: 93 MG/DL
HBA1C MFR BLD HPLC: 5.4 %
HCT VFR BLD CALC: 43.7 %
HDLC SERPL-MCNC: 83 MG/DL
HGB BLD-MCNC: 14.3 G/DL
IMM GRANULOCYTES NFR BLD AUTO: 0 %
IRON SATN MFR SERPL: 39 %
IRON SERPL-MCNC: 110 UG/DL
KETONES URINE: NEGATIVE MG/DL
LDLC SERPL CALC-MCNC: 111 MG/DL
LEUKOCYTE ESTERASE URINE: ABNORMAL
LYMPHOCYTES # BLD AUTO: 1.63 K/UL
LYMPHOCYTES NFR BLD AUTO: 43.1 %
MAN DIFF?: NORMAL
MCHC RBC-ENTMCNC: 29.9 PG
MCHC RBC-ENTMCNC: 32.7 GM/DL
MCV RBC AUTO: 91.4 FL
MONOCYTES # BLD AUTO: 0.33 K/UL
MONOCYTES NFR BLD AUTO: 8.7 %
NEUTROPHILS # BLD AUTO: 1.72 K/UL
NEUTROPHILS NFR BLD AUTO: 45.6 %
NITRITE URINE: NEGATIVE
NONHDLC SERPL-MCNC: 127 MG/DL
PH URINE: 6
PLATELET # BLD AUTO: 272 K/UL
POTASSIUM SERPL-SCNC: 4.2 MMOL/L
PROT SERPL-MCNC: 7.2 G/DL
PROTEIN URINE: NEGATIVE MG/DL
RBC # BLD: 4.78 M/UL
RBC # FLD: 14.5 %
SODIUM SERPL-SCNC: 139 MMOL/L
SPECIFIC GRAVITY URINE: 1.02
T4 SERPL-MCNC: 10.2 UG/DL
TIBC SERPL-MCNC: 282 UG/DL
TRIGL SERPL-MCNC: 94 MG/DL
TSH SERPL-ACNC: 2.15 UIU/ML
UIBC SERPL-MCNC: 171 UG/DL
UROBILINOGEN URINE: 0.2 MG/DL
VIT B12 SERPL-MCNC: 1246 PG/ML
WBC # FLD AUTO: 3.78 K/UL

## 2024-10-14 LAB — URINE CULTURE <10: ABNORMAL

## 2024-10-16 PROBLEM — E78.2 MIXED HYPERLIPIDEMIA: Status: ACTIVE | Noted: 2024-10-16

## 2024-10-17 ENCOUNTER — APPOINTMENT (OUTPATIENT)
Dept: FAMILY MEDICINE | Facility: CLINIC | Age: 69
End: 2024-10-17
Payer: MEDICARE

## 2024-10-17 DIAGNOSIS — M54.12 RADICULOPATHY, CERVICAL REGION: ICD-10-CM

## 2024-10-17 DIAGNOSIS — M54.50 LOW BACK PAIN, UNSPECIFIED: ICD-10-CM

## 2024-10-17 DIAGNOSIS — E66.3 OVERWEIGHT: ICD-10-CM

## 2024-10-17 DIAGNOSIS — R94.4 ABNORMAL RESULTS OF KIDNEY FUNCTION STUDIES: ICD-10-CM

## 2024-10-17 DIAGNOSIS — Z80.1 FAMILY HISTORY OF MALIGNANT NEOPLASM OF TRACHEA, BRONCHUS AND LUNG: ICD-10-CM

## 2024-10-17 DIAGNOSIS — Z82.49 FAMILY HISTORY OF ISCHEMIC HEART DISEASE AND OTHER DISEASES OF THE CIRCULATORY SYSTEM: ICD-10-CM

## 2024-10-17 DIAGNOSIS — E55.9 VITAMIN D DEFICIENCY, UNSPECIFIED: ICD-10-CM

## 2024-10-17 DIAGNOSIS — R53.83 OTHER FATIGUE: ICD-10-CM

## 2024-10-17 DIAGNOSIS — Z83.3 FAMILY HISTORY OF DIABETES MELLITUS: ICD-10-CM

## 2024-10-17 DIAGNOSIS — Z80.3 FAMILY HISTORY OF MALIGNANT NEOPLASM OF BREAST: ICD-10-CM

## 2024-10-17 DIAGNOSIS — Z78.9 OTHER SPECIFIED HEALTH STATUS: ICD-10-CM

## 2024-10-17 DIAGNOSIS — E78.2 MIXED HYPERLIPIDEMIA: ICD-10-CM

## 2024-10-17 PROCEDURE — G2211 COMPLEX E/M VISIT ADD ON: CPT

## 2024-10-17 PROCEDURE — 99214 OFFICE O/P EST MOD 30 MIN: CPT

## 2024-10-22 ENCOUNTER — APPOINTMENT (OUTPATIENT)
Dept: DERMATOLOGY | Facility: CLINIC | Age: 69
End: 2024-10-22
Payer: MEDICARE

## 2024-10-22 PROCEDURE — 99213 OFFICE O/P EST LOW 20 MIN: CPT

## 2024-10-30 ENCOUNTER — APPOINTMENT (OUTPATIENT)
Dept: GASTROENTEROLOGY | Facility: CLINIC | Age: 69
End: 2024-10-30
Payer: MEDICARE

## 2024-10-30 VITALS
HEIGHT: 60 IN | RESPIRATION RATE: 14 BRPM | TEMPERATURE: 98 F | HEART RATE: 80 BPM | WEIGHT: 141 LBS | DIASTOLIC BLOOD PRESSURE: 71 MMHG | SYSTOLIC BLOOD PRESSURE: 142 MMHG | OXYGEN SATURATION: 97 % | BODY MASS INDEX: 27.68 KG/M2

## 2024-10-30 DIAGNOSIS — E55.9 VITAMIN D DEFICIENCY, UNSPECIFIED: ICD-10-CM

## 2024-10-30 DIAGNOSIS — M54.12 RADICULOPATHY, CERVICAL REGION: ICD-10-CM

## 2024-10-30 DIAGNOSIS — Z12.11 ENCOUNTER FOR SCREENING FOR MALIGNANT NEOPLASM OF COLON: ICD-10-CM

## 2024-10-30 DIAGNOSIS — K80.20 CALCULUS OF GALLBLADDER W/OUT CHOLECYSTITIS W/OUT OBSTRUCTION: ICD-10-CM

## 2024-10-30 DIAGNOSIS — K57.32 DIVERTICULITIS OF LARGE INTESTINE W/OUT PERFORATION OR ABSCESS W/OUT BLEEDING: ICD-10-CM

## 2024-10-30 DIAGNOSIS — N81.10 CYSTOCELE, UNSPECIFIED: ICD-10-CM

## 2024-10-30 DIAGNOSIS — Z86.0100 PERSONAL HISTORY OF COLON POLYPS, UNSPECIFIED: ICD-10-CM

## 2024-10-30 DIAGNOSIS — M54.17 RADICULOPATHY, LUMBOSACRAL REGION: ICD-10-CM

## 2024-10-30 DIAGNOSIS — N81.6 CYSTOCELE, UNSPECIFIED: ICD-10-CM

## 2024-10-30 DIAGNOSIS — H91.90 UNSPECIFIED HEARING LOSS, UNSPECIFIED EAR: ICD-10-CM

## 2024-10-30 PROCEDURE — 99214 OFFICE O/P EST MOD 30 MIN: CPT

## 2024-10-30 PROCEDURE — G2211 COMPLEX E/M VISIT ADD ON: CPT

## 2024-10-30 RX ORDER — SODIUM SULFATE, POTASSIUM SULFATE AND MAGNESIUM SULFATE 1.6; 3.13; 17.5 G/177ML; G/177ML; G/177ML
17.5-3.13-1.6 SOLUTION ORAL
Qty: 354 | Refills: 0 | Status: ACTIVE | COMMUNITY
Start: 2024-10-30 | End: 1900-01-01

## 2024-11-13 ENCOUNTER — OUTPATIENT (OUTPATIENT)
Dept: OUTPATIENT SERVICES | Facility: HOSPITAL | Age: 69
LOS: 1 days | End: 2024-11-13
Payer: MEDICARE

## 2024-11-13 ENCOUNTER — APPOINTMENT (OUTPATIENT)
Dept: CT IMAGING | Facility: CLINIC | Age: 69
End: 2024-11-13
Payer: MEDICARE

## 2024-11-13 DIAGNOSIS — Z90.710 ACQUIRED ABSENCE OF BOTH CERVIX AND UTERUS: Chronic | ICD-10-CM

## 2024-11-13 DIAGNOSIS — Z86.0100 PERSONAL HISTORY OF COLON POLYPS, UNSPECIFIED: ICD-10-CM

## 2024-11-13 PROCEDURE — 74177 CT ABD & PELVIS W/CONTRAST: CPT | Mod: 26

## 2024-11-13 PROCEDURE — 74177 CT ABD & PELVIS W/CONTRAST: CPT

## 2024-12-02 ENCOUNTER — OFFICE (OUTPATIENT)
Dept: URBAN - METROPOLITAN AREA CLINIC 104 | Facility: CLINIC | Age: 69
Setting detail: OPHTHALMOLOGY
End: 2024-12-02
Payer: MEDICARE

## 2024-12-02 DIAGNOSIS — H01.002: ICD-10-CM

## 2024-12-02 DIAGNOSIS — H01.001: ICD-10-CM

## 2024-12-02 DIAGNOSIS — H43.813: ICD-10-CM

## 2024-12-02 DIAGNOSIS — H11.823: ICD-10-CM

## 2024-12-02 DIAGNOSIS — H01.004: ICD-10-CM

## 2024-12-02 DIAGNOSIS — H01.005: ICD-10-CM

## 2024-12-02 DIAGNOSIS — Z96.1: ICD-10-CM

## 2024-12-02 DIAGNOSIS — H26.493: ICD-10-CM

## 2024-12-02 DIAGNOSIS — H35.033: ICD-10-CM

## 2024-12-02 PROCEDURE — 92014 COMPRE OPH EXAM EST PT 1/>: CPT | Performed by: OPHTHALMOLOGY

## 2024-12-02 ASSESSMENT — REFRACTION_CURRENTRX
OS_SPHERE: -1.75
OD_AXIS: 32
OD_CYLINDER: -0.25
OS_CYLINDER: -0.25
OD_OVR_VA: 20/
OD_SPHERE: -2.00
OS_OVR_VA: 20/
OS_AXIS: 106

## 2024-12-02 ASSESSMENT — TONOMETRY
OD_IOP_MMHG: 13
OS_IOP_MMHG: 13

## 2024-12-02 ASSESSMENT — REFRACTION_AUTOREFRACTION
OS_CYLINDER: -1.00
OD_SPHERE: -2.00
OS_SPHERE: -1.25
OS_AXIS: 80

## 2024-12-02 ASSESSMENT — VISUAL ACUITY
OD_BCVA: 20/20
OS_BCVA: 20/20

## 2024-12-02 ASSESSMENT — CONFRONTATIONAL VISUAL FIELD TEST (CVF)
OD_FINDINGS: FULL
OS_FINDINGS: FULL

## 2024-12-03 RX ORDER — CIPROFLOXACIN HYDROCHLORIDE 500 MG/1
500 TABLET, FILM COATED ORAL
Qty: 20 | Refills: 1 | Status: ACTIVE | COMMUNITY
Start: 2024-12-03 | End: 1900-01-01

## 2024-12-03 RX ORDER — METRONIDAZOLE 500 MG/1
500 TABLET ORAL
Qty: 30 | Refills: 1 | Status: ACTIVE | COMMUNITY
Start: 2024-12-03 | End: 1900-01-01

## 2025-01-02 ENCOUNTER — RX RENEWAL (OUTPATIENT)
Age: 70
End: 2025-01-02

## 2025-01-15 ENCOUNTER — APPOINTMENT (OUTPATIENT)
Dept: GASTROENTEROLOGY | Facility: GI CENTER | Age: 70
End: 2025-01-15
Payer: MEDICARE

## 2025-01-15 ENCOUNTER — TRANSCRIPTION ENCOUNTER (OUTPATIENT)
Age: 70
End: 2025-01-15

## 2025-01-15 ENCOUNTER — OUTPATIENT (OUTPATIENT)
Dept: OUTPATIENT SERVICES | Facility: HOSPITAL | Age: 70
LOS: 1 days | End: 2025-01-15
Payer: MEDICARE

## 2025-01-15 DIAGNOSIS — Z86.010 PERSONAL HISTORY OF COLON POLYPS: ICD-10-CM

## 2025-01-15 DIAGNOSIS — Z90.710 ACQUIRED ABSENCE OF BOTH CERVIX AND UTERUS: Chronic | ICD-10-CM

## 2025-01-15 DIAGNOSIS — Z12.11 ENCOUNTER FOR SCREENING FOR MALIGNANT NEOPLASM OF COLON: ICD-10-CM

## 2025-01-15 DIAGNOSIS — K57.32 DIVERTICULITIS OF LARGE INTESTINE W/OUT PERFORATION OR ABSCESS W/OUT BLEEDING: ICD-10-CM

## 2025-01-15 DIAGNOSIS — Z86.0100 PERSONAL HISTORY OF COLON POLYPS, UNSPECIFIED: ICD-10-CM

## 2025-01-15 PROCEDURE — G0105: CPT

## 2025-01-15 PROCEDURE — 45378 DIAGNOSTIC COLONOSCOPY: CPT | Mod: PT

## 2025-04-08 ENCOUNTER — APPOINTMENT (OUTPATIENT)
Dept: FAMILY MEDICINE | Facility: CLINIC | Age: 70
End: 2025-04-08
Payer: MEDICARE

## 2025-04-08 ENCOUNTER — LABORATORY RESULT (OUTPATIENT)
Age: 70
End: 2025-04-08

## 2025-04-08 VITALS
BODY MASS INDEX: 27.09 KG/M2 | OXYGEN SATURATION: 97 % | DIASTOLIC BLOOD PRESSURE: 84 MMHG | TEMPERATURE: 98.3 F | RESPIRATION RATE: 12 BRPM | WEIGHT: 138 LBS | SYSTOLIC BLOOD PRESSURE: 130 MMHG | HEART RATE: 81 BPM | HEIGHT: 60 IN

## 2025-04-08 DIAGNOSIS — E66.3 OVERWEIGHT: ICD-10-CM

## 2025-04-08 DIAGNOSIS — Z82.49 FAMILY HISTORY OF ISCHEMIC HEART DISEASE AND OTHER DISEASES OF THE CIRCULATORY SYSTEM: ICD-10-CM

## 2025-04-08 DIAGNOSIS — Z80.1 FAMILY HISTORY OF MALIGNANT NEOPLASM OF TRACHEA, BRONCHUS AND LUNG: ICD-10-CM

## 2025-04-08 DIAGNOSIS — Z83.3 FAMILY HISTORY OF DIABETES MELLITUS: ICD-10-CM

## 2025-04-08 DIAGNOSIS — Z78.9 OTHER SPECIFIED HEALTH STATUS: ICD-10-CM

## 2025-04-08 DIAGNOSIS — Z80.3 FAMILY HISTORY OF MALIGNANT NEOPLASM OF BREAST: ICD-10-CM

## 2025-04-08 DIAGNOSIS — R53.83 OTHER FATIGUE: ICD-10-CM

## 2025-04-08 DIAGNOSIS — E55.9 VITAMIN D DEFICIENCY, UNSPECIFIED: ICD-10-CM

## 2025-04-08 DIAGNOSIS — R94.4 ABNORMAL RESULTS OF KIDNEY FUNCTION STUDIES: ICD-10-CM

## 2025-04-08 DIAGNOSIS — K80.20 CALCULUS OF GALLBLADDER W/OUT CHOLECYSTITIS W/OUT OBSTRUCTION: ICD-10-CM

## 2025-04-08 DIAGNOSIS — K57.32 DIVERTICULITIS OF LARGE INTESTINE W/OUT PERFORATION OR ABSCESS W/OUT BLEEDING: ICD-10-CM

## 2025-04-08 PROCEDURE — 99214 OFFICE O/P EST MOD 30 MIN: CPT

## 2025-04-08 PROCEDURE — G2211 COMPLEX E/M VISIT ADD ON: CPT

## 2025-04-08 PROCEDURE — 36415 COLL VENOUS BLD VENIPUNCTURE: CPT

## 2025-04-10 ENCOUNTER — APPOINTMENT (OUTPATIENT)
Dept: GASTROENTEROLOGY | Facility: CLINIC | Age: 70
End: 2025-04-10

## 2025-04-10 LAB
25(OH)D3 SERPL-MCNC: 109 NG/ML
ALBUMIN SERPL ELPH-MCNC: 4.5 G/DL
ALP BLD-CCNC: 57 U/L
ALT SERPL-CCNC: 22 U/L
ANION GAP SERPL CALC-SCNC: 13 MMOL/L
APPEARANCE: CLEAR
AST SERPL-CCNC: 22 U/L
BASOPHILS # BLD AUTO: 0.07 K/UL
BASOPHILS NFR BLD AUTO: 1.5 %
BILIRUB SERPL-MCNC: 0.6 MG/DL
BILIRUBIN URINE: NEGATIVE
BLOOD URINE: NEGATIVE
BUN SERPL-MCNC: 14 MG/DL
C PEPTIDE SERPL-MCNC: 2.5 NG/ML
CALCIUM SERPL-MCNC: 10 MG/DL
CHLORIDE SERPL-SCNC: 104 MMOL/L
CHOLEST SERPL-MCNC: 209 MG/DL
CO2 SERPL-SCNC: 24 MMOL/L
COLOR: YELLOW
CREAT SERPL-MCNC: 0.84 MG/DL
CREAT SPEC-SCNC: 159 MG/DL
EGFRCR SERPLBLD CKD-EPI 2021: 74 ML/MIN/1.73M2
EOSINOPHIL # BLD AUTO: 0.06 K/UL
EOSINOPHIL NFR BLD AUTO: 1.3 %
ESTIMATED AVERAGE GLUCOSE: 108 MG/DL
FERRITIN SERPL-MCNC: 161 NG/ML
FOLATE SERPL-MCNC: >20 NG/ML
GLUCOSE QUALITATIVE U: NEGATIVE MG/DL
GLUCOSE SERPL-MCNC: 97 MG/DL
HBA1C MFR BLD HPLC: 5.4 %
HCT VFR BLD CALC: 43.6 %
HDLC SERPL-MCNC: 88 MG/DL
HGB BLD-MCNC: 14 G/DL
IMM GRANULOCYTES NFR BLD AUTO: 0.2 %
IRON SATN MFR SERPL: 49 %
IRON SERPL-MCNC: 132 UG/DL
KETONES URINE: NEGATIVE MG/DL
LDLC SERPL-MCNC: 104 MG/DL
LEUKOCYTE ESTERASE URINE: ABNORMAL
LYMPHOCYTES # BLD AUTO: 1.93 K/UL
LYMPHOCYTES NFR BLD AUTO: 40.3 %
MAN DIFF?: NORMAL
MCHC RBC-ENTMCNC: 28.8 PG
MCHC RBC-ENTMCNC: 32.1 G/DL
MCV RBC AUTO: 89.7 FL
MICROALBUMIN 24H UR DL<=1MG/L-MCNC: 1.4 MG/DL
MICROALBUMIN/CREAT 24H UR-RTO: 9 MG/G
MONOCYTES # BLD AUTO: 0.41 K/UL
MONOCYTES NFR BLD AUTO: 8.6 %
NEUTROPHILS # BLD AUTO: 2.31 K/UL
NEUTROPHILS NFR BLD AUTO: 48.1 %
NITRITE URINE: NEGATIVE
NONHDLC SERPL-MCNC: 121 MG/DL
PH URINE: 5.5
PLATELET # BLD AUTO: 265 K/UL
POTASSIUM SERPL-SCNC: 4.5 MMOL/L
PROT SERPL-MCNC: 7.1 G/DL
PROTEIN URINE: NORMAL MG/DL
RBC # BLD: 4.86 M/UL
RBC # FLD: 14 %
SODIUM SERPL-SCNC: 141 MMOL/L
SPECIFIC GRAVITY URINE: 1.02
T4 SERPL-MCNC: 9.9 UG/DL
TIBC SERPL-MCNC: 270 UG/DL
TRIGL SERPL-MCNC: 98 MG/DL
TSH SERPL-ACNC: 2.51 UIU/ML
UIBC SERPL-MCNC: 138 UG/DL
UROBILINOGEN URINE: 0.2 MG/DL
VIT B12 SERPL-MCNC: 999 PG/ML
WBC # FLD AUTO: 4.79 K/UL

## 2025-04-14 LAB — URINE CULTURE <10: ABNORMAL

## 2025-04-21 ENCOUNTER — APPOINTMENT (OUTPATIENT)
Dept: DERMATOLOGY | Facility: CLINIC | Age: 70
End: 2025-04-21
Payer: MEDICARE

## 2025-04-21 PROCEDURE — 99213 OFFICE O/P EST LOW 20 MIN: CPT

## 2025-04-24 ENCOUNTER — APPOINTMENT (OUTPATIENT)
Dept: FAMILY MEDICINE | Facility: CLINIC | Age: 70
End: 2025-04-24
Payer: MEDICARE

## 2025-04-24 DIAGNOSIS — Z82.49 FAMILY HISTORY OF ISCHEMIC HEART DISEASE AND OTHER DISEASES OF THE CIRCULATORY SYSTEM: ICD-10-CM

## 2025-04-24 DIAGNOSIS — Z80.1 FAMILY HISTORY OF MALIGNANT NEOPLASM OF TRACHEA, BRONCHUS AND LUNG: ICD-10-CM

## 2025-04-24 DIAGNOSIS — Z83.3 FAMILY HISTORY OF DIABETES MELLITUS: ICD-10-CM

## 2025-04-24 DIAGNOSIS — K57.32 DIVERTICULITIS OF LARGE INTESTINE W/OUT PERFORATION OR ABSCESS W/OUT BLEEDING: ICD-10-CM

## 2025-04-24 DIAGNOSIS — E78.2 MIXED HYPERLIPIDEMIA: ICD-10-CM

## 2025-04-24 DIAGNOSIS — Z80.3 FAMILY HISTORY OF MALIGNANT NEOPLASM OF BREAST: ICD-10-CM

## 2025-04-24 DIAGNOSIS — E66.3 OVERWEIGHT: ICD-10-CM

## 2025-04-24 DIAGNOSIS — K80.20 CALCULUS OF GALLBLADDER W/OUT CHOLECYSTITIS W/OUT OBSTRUCTION: ICD-10-CM

## 2025-04-24 DIAGNOSIS — R94.4 ABNORMAL RESULTS OF KIDNEY FUNCTION STUDIES: ICD-10-CM

## 2025-04-24 DIAGNOSIS — E55.9 VITAMIN D DEFICIENCY, UNSPECIFIED: ICD-10-CM

## 2025-04-24 DIAGNOSIS — Z78.9 OTHER SPECIFIED HEALTH STATUS: ICD-10-CM

## 2025-04-24 DIAGNOSIS — R53.83 OTHER FATIGUE: ICD-10-CM

## 2025-04-24 PROCEDURE — 99214 OFFICE O/P EST MOD 30 MIN: CPT

## 2025-04-24 PROCEDURE — G2211 COMPLEX E/M VISIT ADD ON: CPT

## 2025-04-27 ENCOUNTER — EMERGENCY (EMERGENCY)
Facility: HOSPITAL | Age: 70
LOS: 1 days | End: 2025-04-27
Attending: EMERGENCY MEDICINE
Payer: MEDICARE

## 2025-04-27 VITALS
OXYGEN SATURATION: 100 % | DIASTOLIC BLOOD PRESSURE: 57 MMHG | HEART RATE: 51 BPM | TEMPERATURE: 97 F | SYSTOLIC BLOOD PRESSURE: 141 MMHG | RESPIRATION RATE: 14 BRPM

## 2025-04-27 VITALS
HEIGHT: 60 IN | WEIGHT: 135.36 LBS | OXYGEN SATURATION: 98 % | DIASTOLIC BLOOD PRESSURE: 76 MMHG | HEART RATE: 61 BPM | TEMPERATURE: 97 F | SYSTOLIC BLOOD PRESSURE: 167 MMHG | RESPIRATION RATE: 18 BRPM

## 2025-04-27 DIAGNOSIS — Z90.710 ACQUIRED ABSENCE OF BOTH CERVIX AND UTERUS: Chronic | ICD-10-CM

## 2025-04-27 LAB
ALBUMIN SERPL ELPH-MCNC: 4.7 G/DL — SIGNIFICANT CHANGE UP (ref 3.3–5.2)
ALP SERPL-CCNC: 60 U/L — SIGNIFICANT CHANGE UP (ref 40–120)
ALT FLD-CCNC: 17 U/L — SIGNIFICANT CHANGE UP
ANION GAP SERPL CALC-SCNC: 13 MMOL/L — SIGNIFICANT CHANGE UP (ref 5–17)
AST SERPL-CCNC: 24 U/L — SIGNIFICANT CHANGE UP
BASOPHILS # BLD AUTO: 0.04 K/UL — SIGNIFICANT CHANGE UP (ref 0–0.2)
BASOPHILS NFR BLD AUTO: 0.8 % — SIGNIFICANT CHANGE UP (ref 0–2)
BILIRUB SERPL-MCNC: 0.4 MG/DL — SIGNIFICANT CHANGE UP (ref 0.4–2)
BUN SERPL-MCNC: 11.8 MG/DL — SIGNIFICANT CHANGE UP (ref 8–20)
CALCIUM SERPL-MCNC: 9.4 MG/DL — SIGNIFICANT CHANGE UP (ref 8.4–10.5)
CHLORIDE SERPL-SCNC: 104 MMOL/L — SIGNIFICANT CHANGE UP (ref 96–108)
CO2 SERPL-SCNC: 24 MMOL/L — SIGNIFICANT CHANGE UP (ref 22–29)
CREAT SERPL-MCNC: 0.64 MG/DL — SIGNIFICANT CHANGE UP (ref 0.5–1.3)
EGFR: 94 ML/MIN/1.73M2 — SIGNIFICANT CHANGE UP
EGFR: 94 ML/MIN/1.73M2 — SIGNIFICANT CHANGE UP
EOSINOPHIL # BLD AUTO: 0.02 K/UL — SIGNIFICANT CHANGE UP (ref 0–0.5)
EOSINOPHIL NFR BLD AUTO: 0.4 % — SIGNIFICANT CHANGE UP (ref 0–6)
GLUCOSE SERPL-MCNC: 99 MG/DL — SIGNIFICANT CHANGE UP (ref 70–99)
HCT VFR BLD CALC: 46.8 % — HIGH (ref 34.5–45)
HGB BLD-MCNC: 15.4 G/DL — SIGNIFICANT CHANGE UP (ref 11.5–15.5)
IMM GRANULOCYTES # BLD AUTO: 0.02 K/UL — SIGNIFICANT CHANGE UP (ref 0–0.07)
IMM GRANULOCYTES NFR BLD AUTO: 0.4 % — SIGNIFICANT CHANGE UP (ref 0–0.9)
LYMPHOCYTES # BLD AUTO: 0.82 K/UL — LOW (ref 1–3.3)
LYMPHOCYTES NFR BLD AUTO: 15.8 % — SIGNIFICANT CHANGE UP (ref 13–44)
MCHC RBC-ENTMCNC: 29.5 PG — SIGNIFICANT CHANGE UP (ref 27–34)
MCHC RBC-ENTMCNC: 32.9 G/DL — SIGNIFICANT CHANGE UP (ref 32–36)
MCV RBC AUTO: 89.7 FL — SIGNIFICANT CHANGE UP (ref 80–100)
MONOCYTES # BLD AUTO: 0.2 K/UL — SIGNIFICANT CHANGE UP (ref 0–0.9)
MONOCYTES NFR BLD AUTO: 3.9 % — SIGNIFICANT CHANGE UP (ref 2–14)
NEUTROPHILS # BLD AUTO: 4.09 K/UL — SIGNIFICANT CHANGE UP (ref 1.8–7.4)
NEUTROPHILS NFR BLD AUTO: 78.7 % — HIGH (ref 43–77)
NRBC # BLD AUTO: 0 K/UL — SIGNIFICANT CHANGE UP (ref 0–0)
NRBC # FLD: 0 K/UL — SIGNIFICANT CHANGE UP (ref 0–0)
NRBC BLD AUTO-RTO: 0 /100 WBCS — SIGNIFICANT CHANGE UP (ref 0–0)
PLATELET # BLD AUTO: 280 K/UL — SIGNIFICANT CHANGE UP (ref 150–400)
PMV BLD: 10.8 FL — SIGNIFICANT CHANGE UP (ref 7–13)
POTASSIUM SERPL-MCNC: 4.3 MMOL/L — SIGNIFICANT CHANGE UP (ref 3.5–5.3)
POTASSIUM SERPL-SCNC: 4.3 MMOL/L — SIGNIFICANT CHANGE UP (ref 3.5–5.3)
PROT SERPL-MCNC: 7.9 G/DL — SIGNIFICANT CHANGE UP (ref 6.6–8.7)
RBC # BLD: 5.22 M/UL — HIGH (ref 3.8–5.2)
RBC # FLD: 13.6 % — SIGNIFICANT CHANGE UP (ref 10.3–14.5)
SODIUM SERPL-SCNC: 141 MMOL/L — SIGNIFICANT CHANGE UP (ref 135–145)
WBC # BLD: 5.19 K/UL — SIGNIFICANT CHANGE UP (ref 3.8–10.5)
WBC # FLD AUTO: 5.19 K/UL — SIGNIFICANT CHANGE UP (ref 3.8–10.5)

## 2025-04-27 PROCEDURE — 36415 COLL VENOUS BLD VENIPUNCTURE: CPT

## 2025-04-27 PROCEDURE — 70450 CT HEAD/BRAIN W/O DYE: CPT | Mod: 26

## 2025-04-27 PROCEDURE — 93005 ELECTROCARDIOGRAM TRACING: CPT

## 2025-04-27 PROCEDURE — 70450 CT HEAD/BRAIN W/O DYE: CPT | Mod: MC

## 2025-04-27 PROCEDURE — 80053 COMPREHEN METABOLIC PANEL: CPT

## 2025-04-27 PROCEDURE — 99285 EMERGENCY DEPT VISIT HI MDM: CPT | Mod: 25

## 2025-04-27 PROCEDURE — 85025 COMPLETE CBC W/AUTO DIFF WBC: CPT

## 2025-04-27 PROCEDURE — 93010 ELECTROCARDIOGRAM REPORT: CPT

## 2025-04-27 PROCEDURE — 82962 GLUCOSE BLOOD TEST: CPT

## 2025-04-27 PROCEDURE — 99284 EMERGENCY DEPT VISIT MOD MDM: CPT

## 2025-04-27 PROCEDURE — 96374 THER/PROPH/DIAG INJ IV PUSH: CPT

## 2025-04-27 RX ORDER — METOCLOPRAMIDE HCL 10 MG
10 TABLET ORAL ONCE
Refills: 0 | Status: COMPLETED | OUTPATIENT
Start: 2025-04-27 | End: 2025-04-27

## 2025-04-27 RX ORDER — MECLIZINE HCL 12.5 MG
1 TABLET ORAL
Qty: 30 | Refills: 0
Start: 2025-04-27 | End: 2025-05-06

## 2025-04-27 RX ORDER — SODIUM CHLORIDE 9 G/1000ML
1000 INJECTION, SOLUTION INTRAVENOUS ONCE
Refills: 0 | Status: COMPLETED | OUTPATIENT
Start: 2025-04-27 | End: 2025-04-27

## 2025-04-27 RX ORDER — MECLIZINE HCL 12.5 MG
25 TABLET ORAL ONCE
Refills: 0 | Status: COMPLETED | OUTPATIENT
Start: 2025-04-27 | End: 2025-04-27

## 2025-04-27 RX ORDER — MECLIZINE HCL 12.5 MG
50 TABLET ORAL ONCE
Refills: 0 | Status: COMPLETED | OUTPATIENT
Start: 2025-04-27 | End: 2025-04-27

## 2025-04-27 RX ORDER — DIAZEPAM 5 MG/1
5 TABLET ORAL ONCE
Refills: 0 | Status: DISCONTINUED | OUTPATIENT
Start: 2025-04-27 | End: 2025-04-27

## 2025-04-27 RX ADMIN — Medication 10 MILLIGRAM(S): at 13:28

## 2025-04-27 RX ADMIN — DIAZEPAM 5 MILLIGRAM(S): 5 TABLET ORAL at 15:21

## 2025-04-27 RX ADMIN — SODIUM CHLORIDE 1000 MILLILITER(S): 9 INJECTION, SOLUTION INTRAVENOUS at 13:22

## 2025-04-27 RX ADMIN — Medication 25 MILLIGRAM(S): at 15:21

## 2025-04-27 RX ADMIN — Medication 50 MILLIGRAM(S): at 14:02

## 2025-05-19 ENCOUNTER — APPOINTMENT (OUTPATIENT)
Dept: OBGYN | Facility: CLINIC | Age: 70
End: 2025-05-19

## 2025-05-19 VITALS
DIASTOLIC BLOOD PRESSURE: 64 MMHG | BODY MASS INDEX: 26.7 KG/M2 | HEIGHT: 60 IN | SYSTOLIC BLOOD PRESSURE: 122 MMHG | WEIGHT: 136 LBS

## 2025-05-19 DIAGNOSIS — N81.10 CYSTOCELE, UNSPECIFIED: ICD-10-CM

## 2025-05-19 DIAGNOSIS — N81.6 CYSTOCELE, UNSPECIFIED: ICD-10-CM

## 2025-05-19 DIAGNOSIS — N95.2 POSTMENOPAUSAL ATROPHIC VAGINITIS: ICD-10-CM

## 2025-05-19 DIAGNOSIS — N60.19 DIFFUSE CYSTIC MASTOPATHY OF UNSPECIFIED BREAST: ICD-10-CM

## 2025-05-19 PROCEDURE — 99214 OFFICE O/P EST MOD 30 MIN: CPT | Mod: 25

## 2025-05-19 PROCEDURE — 99459 PELVIC EXAMINATION: CPT

## 2025-05-19 PROCEDURE — G0101: CPT

## 2025-06-13 ENCOUNTER — APPOINTMENT (OUTPATIENT)
Dept: NEUROLOGY | Facility: CLINIC | Age: 70
End: 2025-06-13
Payer: MEDICARE

## 2025-06-13 VITALS
WEIGHT: 136 LBS | SYSTOLIC BLOOD PRESSURE: 149 MMHG | DIASTOLIC BLOOD PRESSURE: 83 MMHG | HEIGHT: 60 IN | BODY MASS INDEX: 26.7 KG/M2

## 2025-06-13 PROBLEM — H81.399 OTHER PERIPHERAL VERTIGO, UNSPECIFIED EAR: Status: ACTIVE | Noted: 2025-06-13

## 2025-06-13 PROCEDURE — 99204 OFFICE O/P NEW MOD 45 MIN: CPT

## 2025-06-16 ENCOUNTER — NON-APPOINTMENT (OUTPATIENT)
Age: 70
End: 2025-06-16

## 2025-06-17 ENCOUNTER — NON-APPOINTMENT (OUTPATIENT)
Age: 70
End: 2025-06-17

## 2025-07-31 ENCOUNTER — NON-APPOINTMENT (OUTPATIENT)
Age: 70
End: 2025-07-31

## 2025-08-04 DIAGNOSIS — N64.89 OTHER SPECIFIED DISORDERS OF BREAST: ICD-10-CM

## 2025-08-06 ENCOUNTER — NON-APPOINTMENT (OUTPATIENT)
Age: 70
End: 2025-08-06

## (undated) DEVICE — KIT DEFENDO 4 OLY 4 PC